# Patient Record
Sex: FEMALE | Race: WHITE | NOT HISPANIC OR LATINO | URBAN - METROPOLITAN AREA
[De-identification: names, ages, dates, MRNs, and addresses within clinical notes are randomized per-mention and may not be internally consistent; named-entity substitution may affect disease eponyms.]

---

## 2018-06-12 ENCOUNTER — EMERGENCY (EMERGENCY)
Facility: HOSPITAL | Age: 33
LOS: 1 days | Discharge: HOME | End: 2018-06-12
Attending: EMERGENCY MEDICINE
Payer: COMMERCIAL

## 2018-06-12 VITALS
RESPIRATION RATE: 20 BRPM | TEMPERATURE: 98 F | DIASTOLIC BLOOD PRESSURE: 90 MMHG | HEART RATE: 80 BPM | OXYGEN SATURATION: 100 % | SYSTOLIC BLOOD PRESSURE: 108 MMHG

## 2018-06-12 VITALS — HEART RATE: 82 BPM | SYSTOLIC BLOOD PRESSURE: 132 MMHG | RESPIRATION RATE: 20 BRPM | DIASTOLIC BLOOD PRESSURE: 84 MMHG

## 2018-06-12 LAB
ALBUMIN SERPL ELPH-MCNC: 4.4 G/DL — SIGNIFICANT CHANGE UP (ref 3.3–5)
ALP SERPL-CCNC: 40 U/L — SIGNIFICANT CHANGE UP (ref 40–120)
ALT FLD-CCNC: 11 U/L — SIGNIFICANT CHANGE UP (ref 10–45)
ANION GAP SERPL CALC-SCNC: 19 MMOL/L — HIGH (ref 5–17)
APPEARANCE UR: CLEAR — SIGNIFICANT CHANGE UP
AST SERPL-CCNC: 17 U/L — SIGNIFICANT CHANGE UP (ref 10–40)
BASOPHILS # BLD AUTO: 0.1 K/UL — SIGNIFICANT CHANGE UP (ref 0–0.2)
BASOPHILS NFR BLD AUTO: 0.4 % — SIGNIFICANT CHANGE UP (ref 0–2)
BILIRUB SERPL-MCNC: 0.6 MG/DL — SIGNIFICANT CHANGE UP (ref 0.2–1.2)
BILIRUB UR-MCNC: NEGATIVE — SIGNIFICANT CHANGE UP
BLD GP AB SCN SERPL QL: NEGATIVE — SIGNIFICANT CHANGE UP
BUN SERPL-MCNC: 11 MG/DL — SIGNIFICANT CHANGE UP (ref 7–23)
CALCIUM SERPL-MCNC: 9.6 MG/DL — SIGNIFICANT CHANGE UP (ref 8.4–10.5)
CHLORIDE SERPL-SCNC: 100 MMOL/L — SIGNIFICANT CHANGE UP (ref 96–108)
CO2 SERPL-SCNC: 18 MMOL/L — LOW (ref 22–31)
COLOR SPEC: SIGNIFICANT CHANGE UP
CREAT SERPL-MCNC: 0.83 MG/DL — SIGNIFICANT CHANGE UP (ref 0.5–1.3)
DIFF PNL FLD: ABNORMAL
EOSINOPHIL # BLD AUTO: 0.1 K/UL — SIGNIFICANT CHANGE UP (ref 0–0.5)
EOSINOPHIL NFR BLD AUTO: 0.4 % — SIGNIFICANT CHANGE UP (ref 0–6)
GLUCOSE SERPL-MCNC: 124 MG/DL — HIGH (ref 70–99)
GLUCOSE UR QL: NEGATIVE — SIGNIFICANT CHANGE UP
HCG SERPL-ACNC: 9170 MIU/ML — HIGH (ref 5–24)
HCT VFR BLD CALC: 41.5 % — SIGNIFICANT CHANGE UP (ref 34.5–45)
HGB BLD-MCNC: 14.6 G/DL — SIGNIFICANT CHANGE UP (ref 11.5–15.5)
KETONES UR-MCNC: NEGATIVE — SIGNIFICANT CHANGE UP
LEUKOCYTE ESTERASE UR-ACNC: NEGATIVE — SIGNIFICANT CHANGE UP
LYMPHOCYTES # BLD AUTO: 21.3 % — SIGNIFICANT CHANGE UP (ref 13–44)
LYMPHOCYTES # BLD AUTO: 3.3 K/UL — SIGNIFICANT CHANGE UP (ref 1–3.3)
MCHC RBC-ENTMCNC: 29.5 PG — SIGNIFICANT CHANGE UP (ref 27–34)
MCHC RBC-ENTMCNC: 35.1 GM/DL — SIGNIFICANT CHANGE UP (ref 32–36)
MCV RBC AUTO: 83.9 FL — SIGNIFICANT CHANGE UP (ref 80–100)
MONOCYTES # BLD AUTO: 0.4 K/UL — SIGNIFICANT CHANGE UP (ref 0–0.9)
MONOCYTES NFR BLD AUTO: 2.6 % — SIGNIFICANT CHANGE UP (ref 2–14)
NEUTROPHILS # BLD AUTO: 11.6 K/UL — HIGH (ref 1.8–7.4)
NEUTROPHILS NFR BLD AUTO: 75.2 % — SIGNIFICANT CHANGE UP (ref 43–77)
NITRITE UR-MCNC: NEGATIVE — SIGNIFICANT CHANGE UP
PH UR: 5.5 — SIGNIFICANT CHANGE UP (ref 5–8)
PLATELET # BLD AUTO: 288 K/UL — SIGNIFICANT CHANGE UP (ref 150–400)
POTASSIUM SERPL-MCNC: 3.8 MMOL/L — SIGNIFICANT CHANGE UP (ref 3.5–5.3)
POTASSIUM SERPL-SCNC: 3.8 MMOL/L — SIGNIFICANT CHANGE UP (ref 3.5–5.3)
PROT SERPL-MCNC: 7.9 G/DL — SIGNIFICANT CHANGE UP (ref 6–8.3)
PROT UR-MCNC: NEGATIVE — SIGNIFICANT CHANGE UP
RBC # BLD: 4.95 M/UL — SIGNIFICANT CHANGE UP (ref 3.8–5.2)
RBC # FLD: 11.7 % — SIGNIFICANT CHANGE UP (ref 10.3–14.5)
RBC CASTS # UR COMP ASSIST: SIGNIFICANT CHANGE UP /HPF (ref 0–2)
RH IG SCN BLD-IMP: POSITIVE — SIGNIFICANT CHANGE UP
SODIUM SERPL-SCNC: 137 MMOL/L — SIGNIFICANT CHANGE UP (ref 135–145)
SP GR SPEC: 1.01 — LOW (ref 1.01–1.02)
UROBILINOGEN FLD QL: NEGATIVE — SIGNIFICANT CHANGE UP
WBC # BLD: 15.5 K/UL — HIGH (ref 3.8–10.5)
WBC # FLD AUTO: 15.5 K/UL — HIGH (ref 3.8–10.5)
WBC UR QL: SIGNIFICANT CHANGE UP /HPF (ref 0–5)

## 2018-06-12 PROCEDURE — 86900 BLOOD TYPING SEROLOGIC ABO: CPT

## 2018-06-12 PROCEDURE — 99284 EMERGENCY DEPT VISIT MOD MDM: CPT

## 2018-06-12 PROCEDURE — 99284 EMERGENCY DEPT VISIT MOD MDM: CPT | Mod: 25

## 2018-06-12 PROCEDURE — 86901 BLOOD TYPING SEROLOGIC RH(D): CPT

## 2018-06-12 PROCEDURE — 76817 TRANSVAGINAL US OBSTETRIC: CPT

## 2018-06-12 PROCEDURE — 85027 COMPLETE CBC AUTOMATED: CPT

## 2018-06-12 PROCEDURE — 76817 TRANSVAGINAL US OBSTETRIC: CPT | Mod: 26

## 2018-06-12 PROCEDURE — 84702 CHORIONIC GONADOTROPIN TEST: CPT

## 2018-06-12 PROCEDURE — 81001 URINALYSIS AUTO W/SCOPE: CPT

## 2018-06-12 PROCEDURE — 93975 VASCULAR STUDY: CPT | Mod: 26

## 2018-06-12 PROCEDURE — 80053 COMPREHEN METABOLIC PANEL: CPT

## 2018-06-12 PROCEDURE — 93975 VASCULAR STUDY: CPT

## 2018-06-12 PROCEDURE — 86850 RBC ANTIBODY SCREEN: CPT

## 2018-06-12 NOTE — ED PROVIDER NOTE - PROGRESS NOTE DETAILS
TED: ZOË consulted. TED: cleared for discharge by obgyn. remains pain free. Feels and appears well. No complaints at present. Eager to leave. Stable for discharge.

## 2018-06-12 NOTE — ED PROVIDER NOTE - PLAN OF CARE
Follow up with your OBGYN within 48-72 hrs. Show copies of your reports given to you. Take all of your medications as previously prescribed.

## 2018-06-12 NOTE — ED PROVIDER NOTE - CARE PLAN
Principal Discharge DX:	Abdominal pain affecting pregnancy  Assessment and plan of treatment:	Follow up with your OBGYN within 48-72 hrs. Show copies of your reports given to you. Take all of your medications as previously prescribed.

## 2018-06-12 NOTE — ED PROVIDER NOTE - MEDICAL DECISION MAKING DETAILS
EGA 6wks with unconfirmed iup - consider torsion given story, ectopic, round ligament pain, gastroenteritis, pyelo/cystitis. HD stable. VSS. Pain free in ED. labs, hcg, TVUS, ob consult prn.

## 2018-06-12 NOTE — ED ADULT NURSE REASSESSMENT NOTE - NS ED NURSE REASSESS COMMENT FT1
US resulted. Pt awaiting OB.  at bedside Pt denies pain. Urine specimen obtained and sent to lab. US resulted. Pt awaiting OB.  at bedside Upon arrival to ED, patient c/o 10/10 LLQ abdominal pain. After 5 min, pain subsided and did not return. Urine specimen obtained and sent to lab. US resulted. Pt awaiting OB.  at bedside

## 2018-06-12 NOTE — ED ADULT NURSE NOTE - OBJECTIVE STATEMENT
32y female brought in by EMS c/o abdominal pain. A&Ox3 and VSS. Denies medical hx. , pt report 6 weeks pregnant. C/o constant RLQ abdominal pain since 10am. Denies taking pain medication at home. Pt reports n/v x2. Denies blood in vomit and stool. Denies vaginal bleeding/discharge. Denies hematuria, urinary frequency and burning upon urination. Denies chest pain, sob, fever/chills. Abdomen soft, tender in RLQ, + bowel sounds.

## 2018-06-12 NOTE — ED PROVIDER NOTE - OBJECTIVE STATEMENT
32F ega 6 wks by lmp  presenting with acute onset llq, sudden onset, severe, non radiating, described as cramping and occurring in waves. Began this am. a/w 1 episode of diarrhea and two of nausea and vomiting that occurred after onset of pain. Pain resolved while in ED. No without complaint. No fever or chills, no urinary symptoms. No vaginal discharge. pregnancy uncomplicated thus far.

## 2018-06-12 NOTE — CONSULT NOTE ADULT - ASSESSMENT
31yo  at 6wk2d by LMP (18) presenting w severe LLQ pain now resolved without any interventions.    #LLQ pain now resolved  - low concern for current ovarian torsion, cannot rule out rupture of ovarian cyst vs intermittently torsing ovary vs pain from GI etiology  - patient counseled, given precautions to return to ED if severe pain reoccurs and persists, if persistent nausea/vomiting/diarrhea  - pt verbalized understanding, all questions answered to pt apparent satisfaction    #first trimester pregnancy  - f/u outpatient w Dr. Kaiser for prenatal care    D/w Dr. Satnam Vali, PGY1

## 2018-06-12 NOTE — ED ADULT NURSE REASSESSMENT NOTE - NS ED NURSE REASSESS COMMENT FT1
OB states okay to discharge pt with outpatient OB followup. Pt discharged. VSS. OB states okay to discharge pt with outpatient OB followup. Pt discharged. VSS. Denies pain.

## 2018-06-12 NOTE — CONSULT NOTE ADULT - SUBJECTIVE AND OBJECTIVE BOX
GYNECOLOGIC CONSULT NOTE    31yo  at 6wk2d by LMP (18) presenting w severe LLQ pain from 10am to 12pm.  Pt states pain began as a cramping pain, became increasingly worse over a 20 minute period to a 10/10 sharp constant pain.  Pt states only had coffee this morning, was sitting at the time pain began, denies any physical activity, trauma.  Had diarrhea, some intermittent nausea and vomiting x2.  Pt states once she got to a room in the ED, pain resolved completely.  Currently denies nausea, vomiting, any abd pain.  No fever, chills, SOB, CP, dysuria, denies blood in stools or in urine, denies vaginal bleeding, discharge, recent illness.    OB/GYN HISTORY:  2016  Denies fibroids, vaguely believes was told she had a L ov cyst in 2016 at her 6wk postpartum visit but did not need follow-up, denies STIs, denies abnl pap smears    Surgical History:  Denies    Past Medical History: denies    No Known Allergies    Social History: denies T/E/D    REVIEW OF SYSTEMS:    CONSTITUTIONAL: No fever, weight loss, or fatigue  RESPIRATORY: No cough, wheezing, chills or hemoptysis; No shortness of breath  CARDIOVASCULAR: No chest pain, palpitations, dizziness, or leg swelling  GASTROINTESTINAL: No hematemesis; No constipation. No melena or hematochezia.  GENITOURINARY: No dysuria, frequency, hematuria, or incontinence  SKIN: No itching, burning, rashes, or lesions   MUSCULOSKELETAL: No joint pain or swelling; No muscle, back, or extremity pain    MEDICATIONS  (STANDING): PNV, vitamin D    MEDICATIONS  (PRN):    OBJECTIVE FINDINGS:    Vital Signs Last 24 Hrs  T(C): 36.4 (2018 12:00), Max: 36.4 (2018 12:00)  T(F): 97.6 (2018 12:00), Max: 97.6 (2018 12:00)  HR: 80 (2018 12:00) (80 - 82)  BP: 108/90 (2018 12:00) (108/90 - 132/84)  RR: 20 (2018 12:00) (20 - 20)  SpO2: 100% (2018 12:00) (100% - 100%)    PHYSICAL EXAM:    GENERAL: NAD, well-developed  NERVOUS SYSTEM:  Alert & Oriented X3, Good concentration  CHEST/LUNG: Clear to percussion bilaterally; No rales, rhonchi, wheezing, or rubs  HEART: Regular rate and rhythm; No murmurs, rubs, or gallops  ABDOMEN: Soft, Nontender, Nondistended; Bowel sounds present, No rebound, No guarding  EXTREMITIES:  2+ Peripheral Pulses, No clubbing, cyanosis, or edema, Deepak's sign negative  SKIN: No rashes or lesions  PELVIC: no CMT, no vaginal bleeding or discharge noted, no adnexal masses palpated bilaterally    LABS:                        14.6   15.5  )-----------( 288      ( 2018 12:53 )             41.5         137  |  100  |  11  ----------------------------<  124<H>  3.8   |  18<L>  |  0.83    Ca    9.6      2018 12:53    TPro  7.9  /  Alb  4.4  /  TBili  0.6  /  DBili  x   /  AST  17  /  ALT  11  /  AlkPhos  40        Urinalysis Basic - ( 2018 14:48 )    Color: PL Yellow / Appearance: Clear / S.007 / pH: x  Gluc: x / Ketone: Negative  / Bili: Negative / Urobili: Negative   Blood: x / Protein: Negative / Nitrite: Negative   Leuk Esterase: Negative / RBC: 0-2 /HPF / WBC 3-5 /HPF   Sq Epi: x / Non Sq Epi: x / Bacteria: x      RADIOLOGY & ADDITIONAL STUDIES:  < from: US Doppler Pelvis (18 @ 13:54) >  TECHNIQUE: Transabdominal and Endovaginal pelvic sonogram. Color and   spectral Doppler was performed of the ovaries.    FINDINGS:    Uterus: 8.5 x 4.8 x 6.6 cm.    Gestational Sac Size (mean): 0.9 cm, corresponding to a less than 5 week   gestation. No fetal pole or fetal cardiac activity was visualized. A 0.4   cm subchorionic hematoma is noted.    Yolk Sac: Normal.    Right ovary: 1.9 x 1.2 x 1.3 cm. Within normal limits. Color and spectral   Doppler demonstrated arterial and venous flow.    Left ovary: 3.2 x 2.5 x 3.1 cm. Contains a 2.5 x 1.9 x 1.8 cm corpus   luteal cyst. Color and spectral Doppler demonstrated arterial and venous   flow.    Inferior to the left ovary is a cyst measuring 4.1 x 2.9 x 4.2 cm and an   adjacent smaller cyst measuring 0.7 x 1.1 x 0.7 cm.    Fluid: Small amount of free fluid adjacent to the left adnexal cyst.    IMPRESSION: An intrauterine gestational sac, with mean sac diameter   corresponding to a less than 5 week gestation. A yolk sac is seen,   however, no fetal pole or fetal cardiac activity is seen. Close interval   follow-up is suggested to assess for fetal viability.    A 4.2 cm left adnexal cyst is seen inferior to the left ovary, with an   adjacent 1.1 cm cyst. The differential diagnosis includes a paraovarian   cyst or a peritubal cyst. Given the history of acute onset of pain,   torsion cannot be excluded.    < end of copied text >

## 2019-02-04 ENCOUNTER — INPATIENT (INPATIENT)
Facility: HOSPITAL | Age: 34
LOS: 1 days | Discharge: HOME | End: 2019-02-06
Attending: OBSTETRICS & GYNECOLOGY | Admitting: OBSTETRICS & GYNECOLOGY
Payer: COMMERCIAL

## 2019-02-04 VITALS — WEIGHT: 163.14 LBS

## 2019-02-04 DIAGNOSIS — Z3A.00 WEEKS OF GESTATION OF PREGNANCY NOT SPECIFIED: ICD-10-CM

## 2019-02-04 DIAGNOSIS — O26.899 OTHER SPECIFIED PREGNANCY RELATED CONDITIONS, UNSPECIFIED TRIMESTER: ICD-10-CM

## 2019-02-04 DIAGNOSIS — Z34.80 ENCOUNTER FOR SUPERVISION OF OTHER NORMAL PREGNANCY, UNSPECIFIED TRIMESTER: ICD-10-CM

## 2019-02-04 LAB
BASOPHILS # BLD AUTO: 0 K/UL — SIGNIFICANT CHANGE UP (ref 0–0.2)
BASOPHILS NFR BLD AUTO: 0.4 % — SIGNIFICANT CHANGE UP (ref 0–2)
BLD GP AB SCN SERPL QL: NEGATIVE — SIGNIFICANT CHANGE UP
EOSINOPHIL # BLD AUTO: 0.1 K/UL — SIGNIFICANT CHANGE UP (ref 0–0.5)
EOSINOPHIL NFR BLD AUTO: 0.5 % — SIGNIFICANT CHANGE UP (ref 0–6)
HCT VFR BLD CALC: 38.5 % — SIGNIFICANT CHANGE UP (ref 34.5–45)
HGB BLD-MCNC: 13.1 G/DL — SIGNIFICANT CHANGE UP (ref 11.5–15.5)
LYMPHOCYTES # BLD AUTO: 2.6 K/UL — SIGNIFICANT CHANGE UP (ref 1–3.3)
LYMPHOCYTES # BLD AUTO: 23 % — SIGNIFICANT CHANGE UP (ref 13–44)
MCHC RBC-ENTMCNC: 27.3 PG — SIGNIFICANT CHANGE UP (ref 27–34)
MCHC RBC-ENTMCNC: 34 GM/DL — SIGNIFICANT CHANGE UP (ref 32–36)
MCV RBC AUTO: 80.3 FL — SIGNIFICANT CHANGE UP (ref 80–100)
MONOCYTES # BLD AUTO: 0.7 K/UL — SIGNIFICANT CHANGE UP (ref 0–0.9)
MONOCYTES NFR BLD AUTO: 6.2 % — SIGNIFICANT CHANGE UP (ref 2–14)
NEUTROPHILS # BLD AUTO: 7.8 K/UL — HIGH (ref 1.8–7.4)
NEUTROPHILS NFR BLD AUTO: 69.9 % — SIGNIFICANT CHANGE UP (ref 43–77)
PLATELET # BLD AUTO: 199 K/UL — SIGNIFICANT CHANGE UP (ref 150–400)
RBC # BLD: 4.8 M/UL — SIGNIFICANT CHANGE UP (ref 3.8–5.2)
RBC # FLD: 15.9 % — HIGH (ref 10.3–14.5)
RH IG SCN BLD-IMP: POSITIVE — SIGNIFICANT CHANGE UP
WBC # BLD: 11.1 K/UL — HIGH (ref 3.8–10.5)
WBC # FLD AUTO: 11.1 K/UL — HIGH (ref 3.8–10.5)

## 2019-02-04 RX ORDER — ACETAMINOPHEN 500 MG
975 TABLET ORAL EVERY 6 HOURS
Qty: 0 | Refills: 0 | Status: COMPLETED | OUTPATIENT
Start: 2019-02-04 | End: 2020-01-03

## 2019-02-04 RX ORDER — AER TRAVELER 0.5 G/1
1 SOLUTION RECTAL; TOPICAL EVERY 4 HOURS
Qty: 0 | Refills: 0 | Status: DISCONTINUED | OUTPATIENT
Start: 2019-02-04 | End: 2019-02-04

## 2019-02-04 RX ORDER — GLYCERIN ADULT
1 SUPPOSITORY, RECTAL RECTAL AT BEDTIME
Qty: 0 | Refills: 0 | Status: DISCONTINUED | OUTPATIENT
Start: 2019-02-05 | End: 2019-02-06

## 2019-02-04 RX ORDER — PRAMOXINE HYDROCHLORIDE 150 MG/15G
1 AEROSOL, FOAM RECTAL EVERY 4 HOURS
Qty: 0 | Refills: 0 | Status: DISCONTINUED | OUTPATIENT
Start: 2019-02-05 | End: 2019-02-06

## 2019-02-04 RX ORDER — OXYTOCIN 10 UNIT/ML
333.33 VIAL (ML) INJECTION
Qty: 20 | Refills: 0 | Status: DISCONTINUED | OUTPATIENT
Start: 2019-02-04 | End: 2019-02-04

## 2019-02-04 RX ORDER — AER TRAVELER 0.5 G/1
1 SOLUTION RECTAL; TOPICAL EVERY 4 HOURS
Qty: 0 | Refills: 0 | Status: DISCONTINUED | OUTPATIENT
Start: 2019-02-05 | End: 2019-02-06

## 2019-02-04 RX ORDER — CITRIC ACID/SODIUM CITRATE 300-500 MG
15 SOLUTION, ORAL ORAL EVERY 4 HOURS
Qty: 0 | Refills: 0 | Status: DISCONTINUED | OUTPATIENT
Start: 2019-02-04 | End: 2019-02-04

## 2019-02-04 RX ORDER — OXYTOCIN 10 UNIT/ML
4 VIAL (ML) INJECTION
Qty: 30 | Refills: 0 | Status: DISCONTINUED | OUTPATIENT
Start: 2019-02-04 | End: 2019-02-06

## 2019-02-04 RX ORDER — SODIUM CHLORIDE 9 MG/ML
1000 INJECTION, SOLUTION INTRAVENOUS
Qty: 0 | Refills: 0 | Status: DISCONTINUED | OUTPATIENT
Start: 2019-02-04 | End: 2019-02-04

## 2019-02-04 RX ORDER — LANOLIN
1 OINTMENT (GRAM) TOPICAL EVERY 6 HOURS
Qty: 0 | Refills: 0 | Status: DISCONTINUED | OUTPATIENT
Start: 2019-02-05 | End: 2019-02-06

## 2019-02-04 RX ORDER — MAGNESIUM HYDROXIDE 400 MG/1
30 TABLET, CHEWABLE ORAL
Qty: 0 | Refills: 0 | Status: DISCONTINUED | OUTPATIENT
Start: 2019-02-05 | End: 2019-02-06

## 2019-02-04 RX ORDER — HYDROCORTISONE 1 %
1 OINTMENT (GRAM) TOPICAL EVERY 4 HOURS
Qty: 0 | Refills: 0 | Status: DISCONTINUED | OUTPATIENT
Start: 2019-02-05 | End: 2019-02-06

## 2019-02-04 RX ORDER — OXYTOCIN 10 UNIT/ML
41.67 VIAL (ML) INJECTION
Qty: 20 | Refills: 0 | Status: DISCONTINUED | OUTPATIENT
Start: 2019-02-04 | End: 2019-02-06

## 2019-02-04 RX ORDER — KETOROLAC TROMETHAMINE 30 MG/ML
30 SYRINGE (ML) INJECTION ONCE
Qty: 0 | Refills: 0 | Status: DISCONTINUED | OUTPATIENT
Start: 2019-02-04 | End: 2019-02-04

## 2019-02-04 RX ORDER — SIMETHICONE 80 MG/1
80 TABLET, CHEWABLE ORAL EVERY 6 HOURS
Qty: 0 | Refills: 0 | Status: DISCONTINUED | OUTPATIENT
Start: 2019-02-05 | End: 2019-02-06

## 2019-02-04 RX ORDER — DIPHENHYDRAMINE HCL 50 MG
25 CAPSULE ORAL EVERY 6 HOURS
Qty: 0 | Refills: 0 | Status: DISCONTINUED | OUTPATIENT
Start: 2019-02-05 | End: 2019-02-06

## 2019-02-04 RX ORDER — DIBUCAINE 1 %
1 OINTMENT (GRAM) RECTAL EVERY 4 HOURS
Qty: 0 | Refills: 0 | Status: DISCONTINUED | OUTPATIENT
Start: 2019-02-04 | End: 2019-02-04

## 2019-02-04 RX ORDER — SODIUM CHLORIDE 9 MG/ML
500 INJECTION, SOLUTION INTRAVENOUS ONCE
Qty: 0 | Refills: 0 | Status: DISCONTINUED | OUTPATIENT
Start: 2019-02-04 | End: 2019-02-04

## 2019-02-04 RX ORDER — DIBUCAINE 1 %
1 OINTMENT (GRAM) RECTAL EVERY 4 HOURS
Qty: 0 | Refills: 0 | Status: DISCONTINUED | OUTPATIENT
Start: 2019-02-05 | End: 2019-02-06

## 2019-02-04 RX ORDER — SODIUM CHLORIDE 9 MG/ML
3 INJECTION INTRAMUSCULAR; INTRAVENOUS; SUBCUTANEOUS EVERY 8 HOURS
Qty: 0 | Refills: 0 | Status: DISCONTINUED | OUTPATIENT
Start: 2019-02-04 | End: 2019-02-04

## 2019-02-04 RX ORDER — OXYCODONE HYDROCHLORIDE 5 MG/1
5 TABLET ORAL EVERY 4 HOURS
Qty: 0 | Refills: 0 | Status: DISCONTINUED | OUTPATIENT
Start: 2019-02-04 | End: 2019-02-06

## 2019-02-04 RX ORDER — HYDROCORTISONE 1 %
1 OINTMENT (GRAM) TOPICAL EVERY 4 HOURS
Qty: 0 | Refills: 0 | Status: DISCONTINUED | OUTPATIENT
Start: 2019-02-04 | End: 2019-02-04

## 2019-02-04 RX ORDER — IBUPROFEN 200 MG
600 TABLET ORAL EVERY 6 HOURS
Qty: 0 | Refills: 0 | Status: COMPLETED | OUTPATIENT
Start: 2019-02-04 | End: 2020-01-03

## 2019-02-04 RX ORDER — DOCUSATE SODIUM 100 MG
100 CAPSULE ORAL
Qty: 0 | Refills: 0 | Status: DISCONTINUED | OUTPATIENT
Start: 2019-02-05 | End: 2019-02-06

## 2019-02-04 RX ORDER — OXYCODONE HYDROCHLORIDE 5 MG/1
5 TABLET ORAL
Qty: 0 | Refills: 0 | Status: DISCONTINUED | OUTPATIENT
Start: 2019-02-04 | End: 2019-02-06

## 2019-02-04 RX ORDER — PRAMOXINE HYDROCHLORIDE 150 MG/15G
1 AEROSOL, FOAM RECTAL EVERY 4 HOURS
Qty: 0 | Refills: 0 | Status: DISCONTINUED | OUTPATIENT
Start: 2019-02-04 | End: 2019-02-04

## 2019-02-04 RX ADMIN — Medication 4 MILLIUNIT(S)/MIN: at 20:32

## 2019-02-04 RX ADMIN — Medication 30 MILLIGRAM(S): at 22:10

## 2019-02-05 VITALS
OXYGEN SATURATION: 99 % | DIASTOLIC BLOOD PRESSURE: 73 MMHG | RESPIRATION RATE: 17 BRPM | SYSTOLIC BLOOD PRESSURE: 102 MMHG | TEMPERATURE: 98 F | HEART RATE: 78 BPM

## 2019-02-05 LAB
HCT VFR BLD CALC: 35.2 % — SIGNIFICANT CHANGE UP (ref 34.5–45)
HGB BLD-MCNC: 11.3 G/DL — LOW (ref 11.5–15.5)
T PALLIDUM AB TITR SER: NEGATIVE — SIGNIFICANT CHANGE UP

## 2019-02-05 RX ORDER — IBUPROFEN 200 MG
600 TABLET ORAL EVERY 6 HOURS
Qty: 0 | Refills: 0 | Status: DISCONTINUED | OUTPATIENT
Start: 2019-02-05 | End: 2019-02-06

## 2019-02-05 RX ORDER — ACETAMINOPHEN 500 MG
975 TABLET ORAL EVERY 6 HOURS
Qty: 0 | Refills: 0 | Status: DISCONTINUED | OUTPATIENT
Start: 2019-02-05 | End: 2019-02-06

## 2019-02-05 RX ADMIN — Medication 600 MILLIGRAM(S): at 06:50

## 2019-02-05 RX ADMIN — Medication 600 MILLIGRAM(S): at 18:13

## 2019-02-05 RX ADMIN — Medication 600 MILLIGRAM(S): at 12:20

## 2019-02-05 RX ADMIN — Medication 600 MILLIGRAM(S): at 05:54

## 2019-02-05 RX ADMIN — Medication 975 MILLIGRAM(S): at 05:56

## 2019-02-05 RX ADMIN — Medication 975 MILLIGRAM(S): at 16:25

## 2019-02-05 RX ADMIN — Medication 1 TABLET(S): at 11:50

## 2019-02-05 RX ADMIN — Medication 600 MILLIGRAM(S): at 11:50

## 2019-02-05 RX ADMIN — Medication 975 MILLIGRAM(S): at 06:50

## 2019-02-05 RX ADMIN — Medication 975 MILLIGRAM(S): at 15:55

## 2019-02-05 NOTE — PROGRESS NOTE ADULT - SUBJECTIVE AND OBJECTIVE BOX
OB Progress Note:  PPD#1    S: 32yo  PPD#1 s/p . Patient feels well. Pain is well controlled. She is tolerating a regular diet and passing flatus. She is voiding spontaneously, and ambulating without difficulty. Denies CP/SOB. Denies lightheadedness/dizziness. Denies N/V.    O:  Vitals:  Vital Signs Last 24 Hrs  T(C): 36.9 (2019 23:30), Max: 36.9 (2019 23:30)  T(F): 98.4 (2019 23:30), Max: 98.4 (2019 23:30)  HR: 96 (2019 23:30) (92 - 96)  BP: 130/75 (2019 23:30) (106/75 - 130/75)  BP(mean): 94 (2019 23:30) (83 - 95)  RR: 18 (2019 23:30) (15 - 18)  SpO2: 97% (2019 23:30) (96% - 99%)    MEDICATIONS  (STANDING):  acetaminophen   Tablet .. 975 milliGRAM(s) Oral every 6 hours  ibuprofen  Tablet. 600 milliGRAM(s) Oral every 6 hours  oxyCODONE    IR 5 milliGRAM(s) Oral every 3 hours  oxytocin Infusion 41.667 milliUNIT(s)/Min (125 mL/Hr) IV Continuous <Continuous>  oxytocin Infusion 4 milliUNIT(s)/Min (4 mL/Hr) IV Continuous <Continuous>  prenatal multivitamin 1 Tablet(s) Oral daily      Labs:  Blood type: O Positive  Rubella IgG: RPR:                           13.1   11.1<H> >-----------< 199    (  @ 19:16 )             38.5                  Physical Exam:  General: NAD  Abdomen: soft, non-tender, non-distended, fundus firm  Vaginal: Lochia wnl  Extremities: No erythema/edema

## 2019-02-05 NOTE — PROGRESS NOTE ADULT - PROBLEM SELECTOR PLAN 1
- Pain well controlled, continue current pain regimen  - Increase ambulation, SCDs when not ambulating  - Continue regular diet    Sindhu English PGY1

## 2019-02-06 ENCOUNTER — TRANSCRIPTION ENCOUNTER (OUTPATIENT)
Age: 34
End: 2019-02-06

## 2019-02-06 PROCEDURE — 59050 FETAL MONITOR W/REPORT: CPT

## 2019-02-06 PROCEDURE — 85027 COMPLETE CBC AUTOMATED: CPT

## 2019-02-06 PROCEDURE — G0463: CPT

## 2019-02-06 PROCEDURE — 85018 HEMOGLOBIN: CPT

## 2019-02-06 PROCEDURE — 86850 RBC ANTIBODY SCREEN: CPT

## 2019-02-06 PROCEDURE — 85014 HEMATOCRIT: CPT

## 2019-02-06 PROCEDURE — 86901 BLOOD TYPING SEROLOGIC RH(D): CPT

## 2019-02-06 PROCEDURE — 59025 FETAL NON-STRESS TEST: CPT

## 2019-02-06 PROCEDURE — 86780 TREPONEMA PALLIDUM: CPT

## 2019-02-06 PROCEDURE — 86900 BLOOD TYPING SEROLOGIC ABO: CPT

## 2019-02-06 RX ADMIN — Medication 600 MILLIGRAM(S): at 08:40

## 2019-02-06 RX ADMIN — Medication 600 MILLIGRAM(S): at 08:09

## 2019-02-06 RX ADMIN — Medication 600 MILLIGRAM(S): at 01:15

## 2019-02-06 RX ADMIN — Medication 600 MILLIGRAM(S): at 00:36

## 2019-02-06 NOTE — DISCHARGE NOTE OB - CARE PROVIDER_API CALL
Pastor Hay)  Obstetrics and Gynecology  [address] [place] [id number]  Phone: [phone]  Fax: [phone]  Follow Up Time:

## 2019-02-06 NOTE — DISCHARGE NOTE OB - PATIENT PORTAL LINK FT
You can access the [facility], offered by [facility], by registering with the following website: [email or web address]

## 2019-06-07 ENCOUNTER — TRANSCRIPTION ENCOUNTER (OUTPATIENT)
Age: 34
End: 2019-06-07

## 2022-03-15 ENCOUNTER — TRANSCRIPTION ENCOUNTER (OUTPATIENT)
Age: 37
End: 2022-03-15

## 2022-03-16 ENCOUNTER — INPATIENT (INPATIENT)
Facility: HOSPITAL | Age: 37
LOS: 0 days | Discharge: HOME | DRG: 743 | End: 2022-03-17
Attending: OBSTETRICS & GYNECOLOGY | Admitting: OBSTETRICS & GYNECOLOGY
Payer: COMMERCIAL

## 2022-03-16 ENCOUNTER — TRANSCRIPTION ENCOUNTER (OUTPATIENT)
Age: 37
End: 2022-03-16

## 2022-03-16 VITALS
OXYGEN SATURATION: 100 % | HEIGHT: 65 IN | RESPIRATION RATE: 18 BRPM | HEART RATE: 87 BPM | DIASTOLIC BLOOD PRESSURE: 92 MMHG | SYSTOLIC BLOOD PRESSURE: 148 MMHG | TEMPERATURE: 98 F | WEIGHT: 139.99 LBS

## 2022-03-16 DIAGNOSIS — R10.32 LEFT LOWER QUADRANT PAIN: ICD-10-CM

## 2022-03-16 LAB
ALBUMIN SERPL ELPH-MCNC: 4.7 G/DL — SIGNIFICANT CHANGE UP (ref 3.3–5)
ALP SERPL-CCNC: 42 U/L — SIGNIFICANT CHANGE UP (ref 40–120)
ALT FLD-CCNC: 11 U/L — SIGNIFICANT CHANGE UP (ref 10–45)
ANION GAP SERPL CALC-SCNC: 14 MMOL/L — SIGNIFICANT CHANGE UP (ref 5–17)
APPEARANCE UR: ABNORMAL
APTT BLD: 30.3 SEC — SIGNIFICANT CHANGE UP (ref 27.5–35.5)
AST SERPL-CCNC: 15 U/L — SIGNIFICANT CHANGE UP (ref 10–40)
BACTERIA # UR AUTO: NEGATIVE — SIGNIFICANT CHANGE UP
BILIRUB SERPL-MCNC: 0.6 MG/DL — SIGNIFICANT CHANGE UP (ref 0.2–1.2)
BILIRUB UR-MCNC: NEGATIVE — SIGNIFICANT CHANGE UP
BLD GP AB SCN SERPL QL: NEGATIVE — SIGNIFICANT CHANGE UP
BUN SERPL-MCNC: 15 MG/DL — SIGNIFICANT CHANGE UP (ref 7–23)
CALCIUM SERPL-MCNC: 9.6 MG/DL — SIGNIFICANT CHANGE UP (ref 8.4–10.5)
CHLORIDE SERPL-SCNC: 104 MMOL/L — SIGNIFICANT CHANGE UP (ref 96–108)
CO2 SERPL-SCNC: 21 MMOL/L — LOW (ref 22–31)
COLOR SPEC: YELLOW — SIGNIFICANT CHANGE UP
COMMENT - URINE: SIGNIFICANT CHANGE UP
CREAT SERPL-MCNC: 0.74 MG/DL — SIGNIFICANT CHANGE UP (ref 0.5–1.3)
DIFF PNL FLD: NEGATIVE — SIGNIFICANT CHANGE UP
EGFR: 107 ML/MIN/1.73M2 — SIGNIFICANT CHANGE UP
EPI CELLS # UR: 6 /HPF — HIGH
GLUCOSE SERPL-MCNC: 116 MG/DL — HIGH (ref 70–99)
GLUCOSE UR QL: NEGATIVE — SIGNIFICANT CHANGE UP
HCG SERPL-ACNC: <2 MIU/ML — SIGNIFICANT CHANGE UP
HCT VFR BLD CALC: 41.3 % — SIGNIFICANT CHANGE UP (ref 34.5–45)
HGB BLD-MCNC: 13.5 G/DL — SIGNIFICANT CHANGE UP (ref 11.5–15.5)
HYALINE CASTS # UR AUTO: 13 /LPF — HIGH (ref 0–2)
INR BLD: 1.03 RATIO — SIGNIFICANT CHANGE UP (ref 0.88–1.16)
KETONES UR-MCNC: NEGATIVE — SIGNIFICANT CHANGE UP
LEUKOCYTE ESTERASE UR-ACNC: ABNORMAL
MCHC RBC-ENTMCNC: 27.6 PG — SIGNIFICANT CHANGE UP (ref 27–34)
MCHC RBC-ENTMCNC: 32.7 GM/DL — SIGNIFICANT CHANGE UP (ref 32–36)
MCV RBC AUTO: 84.3 FL — SIGNIFICANT CHANGE UP (ref 80–100)
NITRITE UR-MCNC: NEGATIVE — SIGNIFICANT CHANGE UP
NRBC # BLD: 0 /100 WBCS — SIGNIFICANT CHANGE UP (ref 0–0)
PH UR: 5.5 — SIGNIFICANT CHANGE UP (ref 5–8)
PLATELET # BLD AUTO: 260 K/UL — SIGNIFICANT CHANGE UP (ref 150–400)
POTASSIUM SERPL-MCNC: 4 MMOL/L — SIGNIFICANT CHANGE UP (ref 3.5–5.3)
POTASSIUM SERPL-SCNC: 4 MMOL/L — SIGNIFICANT CHANGE UP (ref 3.5–5.3)
PROT SERPL-MCNC: 7.6 G/DL — SIGNIFICANT CHANGE UP (ref 6–8.3)
PROT UR-MCNC: ABNORMAL
PROTHROM AB SERPL-ACNC: 11.9 SEC — SIGNIFICANT CHANGE UP (ref 10.5–13.4)
RBC # BLD: 4.9 M/UL — SIGNIFICANT CHANGE UP (ref 3.8–5.2)
RBC # FLD: 12.9 % — SIGNIFICANT CHANGE UP (ref 10.3–14.5)
RBC CASTS # UR COMP ASSIST: 1 /HPF — SIGNIFICANT CHANGE UP (ref 0–4)
RH IG SCN BLD-IMP: POSITIVE — SIGNIFICANT CHANGE UP
SARS-COV-2 RNA SPEC QL NAA+PROBE: SIGNIFICANT CHANGE UP
SODIUM SERPL-SCNC: 139 MMOL/L — SIGNIFICANT CHANGE UP (ref 135–145)
SP GR SPEC: 1.04 — HIGH (ref 1.01–1.02)
UROBILINOGEN FLD QL: NEGATIVE — SIGNIFICANT CHANGE UP
WBC # BLD: 9.96 K/UL — SIGNIFICANT CHANGE UP (ref 3.8–10.5)
WBC # FLD AUTO: 9.96 K/UL — SIGNIFICANT CHANGE UP (ref 3.8–10.5)
WBC UR QL: 6 /HPF — HIGH (ref 0–5)

## 2022-03-16 PROCEDURE — G1004: CPT

## 2022-03-16 PROCEDURE — 99285 EMERGENCY DEPT VISIT HI MDM: CPT

## 2022-03-16 PROCEDURE — 93975 VASCULAR STUDY: CPT | Mod: 26

## 2022-03-16 PROCEDURE — 74177 CT ABD & PELVIS W/CONTRAST: CPT | Mod: 26,MG

## 2022-03-16 PROCEDURE — 76830 TRANSVAGINAL US NON-OB: CPT | Mod: 26

## 2022-03-16 RX ORDER — LIDOCAINE 4 G/100G
1 CREAM TOPICAL ONCE
Refills: 0 | Status: COMPLETED | OUTPATIENT
Start: 2022-03-16 | End: 2022-03-16

## 2022-03-16 RX ORDER — SODIUM CHLORIDE 9 MG/ML
1000 INJECTION, SOLUTION INTRAVENOUS
Refills: 0 | Status: DISCONTINUED | OUTPATIENT
Start: 2022-03-16 | End: 2022-03-17

## 2022-03-16 RX ORDER — ACETAMINOPHEN 500 MG
1000 TABLET ORAL ONCE
Refills: 0 | Status: COMPLETED | OUTPATIENT
Start: 2022-03-16 | End: 2022-03-16

## 2022-03-16 RX ORDER — ACETAMINOPHEN 500 MG
1000 TABLET ORAL EVERY 8 HOURS
Refills: 0 | Status: COMPLETED | OUTPATIENT
Start: 2022-03-16 | End: 2022-03-16

## 2022-03-16 RX ORDER — ACETAMINOPHEN 500 MG
975 TABLET ORAL EVERY 6 HOURS
Refills: 0 | Status: DISCONTINUED | OUTPATIENT
Start: 2022-03-16 | End: 2022-03-17

## 2022-03-16 RX ORDER — MORPHINE SULFATE 50 MG/1
4 CAPSULE, EXTENDED RELEASE ORAL ONCE
Refills: 0 | Status: DISCONTINUED | OUTPATIENT
Start: 2022-03-16 | End: 2022-03-16

## 2022-03-16 RX ORDER — ONDANSETRON 8 MG/1
4 TABLET, FILM COATED ORAL ONCE
Refills: 0 | Status: COMPLETED | OUTPATIENT
Start: 2022-03-16 | End: 2022-03-16

## 2022-03-16 RX ORDER — KETOROLAC TROMETHAMINE 30 MG/ML
15 SYRINGE (ML) INJECTION EVERY 6 HOURS
Refills: 0 | Status: DISCONTINUED | OUTPATIENT
Start: 2022-03-16 | End: 2022-03-16

## 2022-03-16 RX ORDER — SIMETHICONE 80 MG/1
80 TABLET, CHEWABLE ORAL DAILY
Refills: 0 | Status: DISCONTINUED | OUTPATIENT
Start: 2022-03-16 | End: 2022-03-17

## 2022-03-16 RX ORDER — HYDROMORPHONE HYDROCHLORIDE 2 MG/ML
1 INJECTION INTRAMUSCULAR; INTRAVENOUS; SUBCUTANEOUS ONCE
Refills: 0 | Status: DISCONTINUED | OUTPATIENT
Start: 2022-03-16 | End: 2022-03-16

## 2022-03-16 RX ORDER — KETOROLAC TROMETHAMINE 30 MG/ML
15 SYRINGE (ML) INJECTION ONCE
Refills: 0 | Status: DISCONTINUED | OUTPATIENT
Start: 2022-03-16 | End: 2022-03-16

## 2022-03-16 RX ORDER — IBUPROFEN 200 MG
600 TABLET ORAL EVERY 6 HOURS
Refills: 0 | Status: DISCONTINUED | OUTPATIENT
Start: 2022-03-16 | End: 2022-03-17

## 2022-03-16 RX ORDER — DIPHENHYDRAMINE HCL 50 MG
50 CAPSULE ORAL ONCE
Refills: 0 | Status: COMPLETED | OUTPATIENT
Start: 2022-03-16 | End: 2022-03-16

## 2022-03-16 RX ADMIN — ONDANSETRON 4 MILLIGRAM(S): 8 TABLET, FILM COATED ORAL at 05:05

## 2022-03-16 RX ADMIN — Medication 600 MILLIGRAM(S): at 21:32

## 2022-03-16 RX ADMIN — Medication 15 MILLIGRAM(S): at 08:44

## 2022-03-16 RX ADMIN — Medication 15 MILLIGRAM(S): at 06:40

## 2022-03-16 RX ADMIN — ONDANSETRON 4 MILLIGRAM(S): 8 TABLET, FILM COATED ORAL at 13:47

## 2022-03-16 RX ADMIN — ONDANSETRON 4 MILLIGRAM(S): 8 TABLET, FILM COATED ORAL at 08:02

## 2022-03-16 RX ADMIN — Medication 1000 MILLIGRAM(S): at 03:45

## 2022-03-16 RX ADMIN — Medication 15 MILLIGRAM(S): at 08:02

## 2022-03-16 RX ADMIN — Medication 50 MILLIGRAM(S): at 22:51

## 2022-03-16 RX ADMIN — HYDROMORPHONE HYDROCHLORIDE 1 MILLIGRAM(S): 2 INJECTION INTRAMUSCULAR; INTRAVENOUS; SUBCUTANEOUS at 13:46

## 2022-03-16 RX ADMIN — Medication 400 MILLIGRAM(S): at 03:30

## 2022-03-16 RX ADMIN — MORPHINE SULFATE 4 MILLIGRAM(S): 50 CAPSULE, EXTENDED RELEASE ORAL at 05:48

## 2022-03-16 RX ADMIN — Medication 1000 MILLIGRAM(S): at 04:39

## 2022-03-16 RX ADMIN — MORPHINE SULFATE 4 MILLIGRAM(S): 50 CAPSULE, EXTENDED RELEASE ORAL at 05:05

## 2022-03-16 RX ADMIN — LIDOCAINE 1 PATCH: 4 CREAM TOPICAL at 08:44

## 2022-03-16 RX ADMIN — Medication 1000 MILLIGRAM(S): at 15:44

## 2022-03-16 RX ADMIN — SIMETHICONE 80 MILLIGRAM(S): 80 TABLET, CHEWABLE ORAL at 22:49

## 2022-03-16 RX ADMIN — LIDOCAINE 1 PATCH: 4 CREAM TOPICAL at 18:25

## 2022-03-16 RX ADMIN — Medication 400 MILLIGRAM(S): at 14:00

## 2022-03-16 RX ADMIN — HYDROMORPHONE HYDROCHLORIDE 1 MILLIGRAM(S): 2 INJECTION INTRAMUSCULAR; INTRAVENOUS; SUBCUTANEOUS at 08:03

## 2022-03-16 RX ADMIN — HYDROMORPHONE HYDROCHLORIDE 1 MILLIGRAM(S): 2 INJECTION INTRAMUSCULAR; INTRAVENOUS; SUBCUTANEOUS at 08:44

## 2022-03-16 RX ADMIN — HYDROMORPHONE HYDROCHLORIDE 1 MILLIGRAM(S): 2 INJECTION INTRAMUSCULAR; INTRAVENOUS; SUBCUTANEOUS at 15:43

## 2022-03-16 RX ADMIN — SODIUM CHLORIDE 100 MILLILITER(S): 9 INJECTION, SOLUTION INTRAVENOUS at 13:48

## 2022-03-16 RX ADMIN — Medication 975 MILLIGRAM(S): at 22:49

## 2022-03-16 RX ADMIN — LIDOCAINE 1 PATCH: 4 CREAM TOPICAL at 06:44

## 2022-03-16 NOTE — PATIENT PROFILE ADULT - FALL HARM RISK - UNIVERSAL INTERVENTIONS
Bed in lowest position, wheels locked, appropriate side rails in place/Call bell, personal items and telephone in reach/Instruct patient to call for assistance before getting out of bed or chair/Non-slip footwear when patient is out of bed/[place] to call system/Physically safe environment - no spills, clutter or unnecessary equipment/Purposeful Proactive Rounding/Room/bathroom lighting operational, light cord in reach

## 2022-03-16 NOTE — CONSULT NOTE ADULT - ASSESSMENT
- Patient to be re-evaluated, recently s/p medication and in no pain currently, benign abdominal exam    [] FULL RECS PENDING ATTENDING FRANCISCO JAVIER Nunez PGY2

## 2022-03-16 NOTE — ED PROVIDER NOTE - PROGRESS NOTE DETAILS
pelvic exam chaperoned by PAULY Mart.  Physiologic cervical discharge with endocervical swab sent for GC amplification.  speculum exam no vaginal bleeding.  Bimanual exam without right/left adnexal tenderness no CMT. Patient reevaluated, in severe pain. states ? similar to episode 4 years ago and they were going to r/o torsion and resolved at the time. will redose toradol and dilaudid. will consult obgyn Scar: reassessed, patient states pain improved with dilauidd but still feels pain in llq. reexamined, + ttp llq.  will consult ob and advise to reevalute at this time. Scar: severe pain returned. reconsulted obgyn to evaluate patient. Sowmya: Patient evaluated by obgyn. pending recommendations. Scar; Patient in severe pain still. will redose dilaudid.

## 2022-03-16 NOTE — ED PROVIDER NOTE - OBJECTIVE STATEMENT
36F with pmh L ovarian torsion 4 years ago, who presents with LLQ non-radiating.  Pain began this evening, gradual in onset reached max intensity over 2 hours, is now 7/10.  Took aleve without significant improvement.  No n/v/d/c.  Better with direct abdominal pressure on the area.

## 2022-03-16 NOTE — H&P ADULT - ATTENDING COMMENTS
Agree with above   Patient admitted for observation and pain management  Has required multiple pain medications but does not have a surgical abdomen or signs of torsion  Pelvic sono demonstrating flow to ovary.    See addended note from 8:50pm for bedside exam    lizandro garcia

## 2022-03-16 NOTE — H&P ADULT - NSHPLABSRESULTS_GEN_ALL_CORE
LABS:             13.5   9.96  )-----------( 260      ( 16 Mar 2022 03:13 )             41.3     03-16  139  |  104  |  15  ----------------------------<  116<H>  4.0   |  21<L>  |  0.74    Ca    9.6      16 Mar 2022 03:13    TPro  7.6  /  Alb  4.7  /  TBili  0.6  /  DBili  x   /  AST  15  /  ALT  11  /  AlkPhos  42  03-16    PT/INR - ( 16 Mar 2022 03:13 )   PT: 11.9 sec;   INR: 1.03 ratio       PTT - ( 16 Mar 2022 03:13 )  PTT:30.3 sec    Urinalysis Basic - ( 16 Mar 2022 06:43 )  Color: Yellow / Appearance: Turbid / S.037 / pH: x  Gluc: x / Ketone: Negative  / Bili: Negative / Urobili: Negative   Blood: x / Protein: Trace / Nitrite: Negative   Leuk Esterase: Moderate / RBC: 1 /hpf / WBC 6 /HPF   Sq Epi: x / Non Sq Epi: 6 /hpf / Bacteria: Negative      RADIOLOGY & ADDITIONAL STUDIES:    < from: CT Abdomen and Pelvis w/ IV Cont (22 @ 05:40) >  ACC: [id number] EXAM:  CT ABDOMEN AND PELVIS IC                        PROCEDURE DATE:  2022    INTERPRETATION:  CLINICAL INFORMATION: Left lower quadrant abdominal pain.  COMPARISON: Pelvic ultrasound from earlier the same day and 2018.  CONTRAST/COMPLICATIONS:  IV Contrast: Omnipaque 350  90 cc administered   10 cc discarded  Oral Contrast: NONE  Complications: None reported at time of study completion  PROCEDURE:  CT of the Abdomen and Pelvis was performed.  Sagittal and coronal reformats were performed.  FINDINGS:  LOWER CHEST: Within normal limits.  LIVER: Focal fat adjacent to the falciform ligament.  BILE DUCTS: Normal caliber.  GALLBLADDER: Within normal limits.  SPLEEN: Within normal limits.  PANCREAS: Within normal limits.  ADRENALS: Within normal limits.  KIDNEYS/URETERS: Within normal limits.  BLADDER: Within normal limits.  REPRODUCTIVE ORGANS: Intrauterine device in place. 2.0 x 1.6 cm left   ovarian corpus luteal cyst. 5.3 x 5.0 cm left paraovarian cyst. Right   ovary within normal limits.  BOWEL: No bowel obstruction or inflammation. Appendix is normal.  PERITONEUM: No ascites.  VESSELS: Circumaortic left renal vein.  RETROPERITONEUM/LYMPH NODES: No lymphadenopathy.  ABDOMINAL WALL: Within normal limits.  BONES: Within normal limits.  IMPRESSION:  1. No bowel obstruction or inflammation.  2. Small left ovarian corpus luteal cyst. Moderate-sized left paraovarian   cyst visualized on a pelvic ultrasound dating back to 2018.  --- End of Report ---  GRAHAM VIZCARRA MD; Resident Radiologist  This document has been electronically signed.  DEMARCUS BLUE MD; Attending Radiologist  This document has been electronically signed. Mar 16 2022  5:55AM  < end of copied text >

## 2022-03-16 NOTE — H&P ADULT - ASSESSMENT
35y/o  presenting to the ED complaining of abdominal pain with a known 5cm paraovarian cyst that has been stable in size for multiple years being admitted for pain. Patient in stable condition, able to ambulate, VSS, no lab abnormalities. TVUS re-demonstrates 5cm paraovarian cyst with no findings suggestive of ovarian torsion. Abdominal exam at bedside benign, however patient requiring multiple rounds of pain medication    - Admit to GYN service  - NPO  - LR@100  - IV Tylenol, IV Toradol for pain  - COVID pcr  - Call GYN team for any increase in pain  - Plan for laparoscopic cystectomy tonight vs tomorrow    D/W Dr. Dugan  ula PGY2 37y/o  presenting to the ED complaining of abdominal pain with a known 5cm paraovarian cyst that has been stable in size for multiple years being admitted for pain. Patient in stable condition, able to ambulate, VSS, no lab abnormalities. TVUS re-demonstrates 5cm paraovarian cyst with no findings suggestive of ovarian torsion. Abdominal exam at bedside benign, however patient requiring multiple rounds of pain medication    - Admit to GYN service  - NPO  - LR@100  - IV Tylenol, IV Toradol for pain  - COVID pcr  - Call GYN team for any increase in pain    D/W Dr. Ritchie Nunez PGY2

## 2022-03-16 NOTE — H&P ADULT - HISTORY OF PRESENT ILLNESS
HPI: 35y/o  presenting to the ED complaining of abdominal pain. GYN consulted for rule out ovarian torsion. Patient reports abdominal pain since last night that has worsened in intensity and severity. She reports feeling pain similar to this 5 years ago but the pain resolved spontaneously She has a known history of a paraovarian cyst that has been monitored with serial ultrasounds and has remained stable in size. She denies lightheadedness, dizziness, HA, CP, palpitations, N/V, urinary symptoms, and changes in bowel habits.    Name of GYN Physician: Dr. Hay    OBHx:     GYNHx: hx of paraovarian cyst; denies fibroids, endometriosis, STI's, Abnormal pap smears   PMHx: Denies   PSHx: Denies  Meds:  None  Allergies: NKDA

## 2022-03-16 NOTE — ED ADULT NURSE NOTE - OBJECTIVE STATEMENT
36 y.o female c/o LLQ pain since 2100 last night. Denies pain radiating anywhere. Hx ovarian torsion 3 yrs ago which self resolved as well as ovarian cysts. Denies pain radiating anywhere. Denies NVD, fever, cough, SOB, CP,  symptoms. Denies PMH. Last took Aleve around 2200 with little relief

## 2022-03-16 NOTE — ED PROVIDER NOTE - PHYSICAL EXAMINATION
Vital signs reviewed.  CONSTITUTIONAL: NAD, awake  HEAD: Normocephalic; atraumatic  EYES: EOMI, no conjunctival injection, no scleral icterus  MOUTH/THROAT:  MMM  NECK: Trachea midline  CV: Normal S1, S2; no audible murmurs; extremities WWP  RESP: normal work of breathing; CTAB, no stridor  ABD: soft, non-distended; mildly tender LLQ  : Deferred - see progress note  MSK/EXT: no edema, no limited ROM  SKIN: No rashes on exposed skin surfaces  NEURO: Moves all extremities spontaneously with no focal deficits, speech is appropriate

## 2022-03-16 NOTE — ED PROVIDER NOTE - ATTENDING CONTRIBUTION TO CARE
Patient presenting complaining of LLQ abdominal pain beginning earlier this evening.  Took NSAID at home without relief.  Reporting similar episode in past which she describes as being ovarian torsion however on further questioning she reports that pain resolved prior to imaging studies spontaneously, no prior surgeries and while there was suspicion for torsion/detorsion at that time it was never confirmed.  Denying nausea, vomiting, constipation, dysuria, hematuria.  Pain sharp, non radiating.    A 14 point review of systems is negative except as in HPI or otherwise documented.    Exam:  General: Patient well appearing but uncomfortable, vital signs within normal limits  HEENT: airway patent with moist mucous membranes  Cardiac: RRR S1/S2 with strong peripheral pulses  Respiratory: lungs clear without respiratory distress  GI: abdomen soft, mildly point tender in LLQ without rebound or guarding, no external pelvic point tenderness, non distended  Neuro: no gross neurologic deficits  Skin: warm, well perfused  Psych: normal mood and affect    Overall lower suspicion for torsion given location of reported symptoms and point tenderness.  No GI symptoms associated to suspect that this is a colitis, could be L sided appendicitis?, diverticulitis possible but less likely given age.  Will obtain labs, CT and ultrasound to further characterize, pain management and re-evaluate.

## 2022-03-16 NOTE — CONSULT NOTE ADULT - SUBJECTIVE AND OBJECTIVE BOX
Gyn Consult Note  ROBINSON WHITFIELD  36y  Female [id number]    HPI: 37y/o  presenting to the ED complaining of abdominal pain. GYN consulted for rule out ovarian torsion.  Patient reports ___. She denies lightheadedness, dizziness, HA, CP, palpitations, N/V, urinary symptoms, and changes in bowel habits.    Name of GYN Physician: Dr. Hay    OBHx:     GYNHx: Denies fibroids, cysts, endometriosis, STI's, Abnormal pap smears   PMHx: Denies   PSHx: Denies  Meds:  None  Allergies: NKDA    Vital Signs Last 24 Hrs  T(C): 36.8 (16 Mar 2022 06:17), Max: 36.8 (16 Mar 2022 06:17)  T(F): 98.2 (16 Mar 2022 06:17), Max: 98.2 (16 Mar 2022 06:17)  HR: 64 (16 Mar 2022 06:17) (64 - 87)  BP: 126/85 (16 Mar 2022 06:17) (126/85 - 148/92)  BP(mean): --  RR: 18 (16 Mar 2022 06:17) (18 - 18)  SpO2: 100% (16 Mar 2022 06:17) (100% - 100%)    Physical Exam:   General: sitting comfortably in bed, NAD   CV: RRR  Lungs: CTAB  Back: No CVA tenderness  Abd: Soft, non-tender, non-distended.  Bowel sounds present.    Ext: non-tender b/l, no edema     LABS:             13.5   9.96  )-----------( 260      ( 16 Mar 2022 03:13 )             41.3     03-16  139  |  104  |  15  ----------------------------<  116<H>  4.0   |  21<L>  |  0.74    Ca    9.6      16 Mar 2022 03:13    TPro  7.6  /  Alb  4.7  /  TBili  0.6  /  DBili  x   /  AST  15  /  ALT  11  /  AlkPhos  42  03-16    PT/INR - ( 16 Mar 2022 03:13 )   PT: 11.9 sec;   INR: 1.03 ratio       PTT - ( 16 Mar 2022 03:13 )  PTT:30.3 sec    Urinalysis Basic - ( 16 Mar 2022 06:43 )  Color: Yellow / Appearance: Turbid / S.037 / pH: x  Gluc: x / Ketone: Negative  / Bili: Negative / Urobili: Negative   Blood: x / Protein: Trace / Nitrite: Negative   Leuk Esterase: Moderate / RBC: 1 /hpf / WBC 6 /HPF   Sq Epi: x / Non Sq Epi: 6 /hpf / Bacteria: Negative        RADIOLOGY & ADDITIONAL STUDIES:    < from: CT Abdomen and Pelvis w/ IV Cont (22 @ 05:40) >  ACC: [id number] EXAM:  CT ABDOMEN AND PELVIS IC                        PROCEDURE DATE:  2022    INTERPRETATION:  CLINICAL INFORMATION: Left lower quadrant abdominal pain.  COMPARISON: Pelvic ultrasound from earlier the same day and 2018.  CONTRAST/COMPLICATIONS:  IV Contrast: Omnipaque 350  90 cc administered   10 cc discarded  Oral Contrast: NONE  Complications: None reported at time of study completion  PROCEDURE:  CT of the Abdomen and Pelvis was performed.  Sagittal and coronal reformats were performed.  FINDINGS:  LOWER CHEST: Within normal limits.  LIVER: Focal fat adjacent to the falciform ligament.  BILE DUCTS: Normal caliber.  GALLBLADDER: Within normal limits.  SPLEEN: Within normal limits.  PANCREAS: Within normal limits.  ADRENALS: Within normal limits.  KIDNEYS/URETERS: Within normal limits.  BLADDER: Within normal limits.  REPRODUCTIVE ORGANS: Intrauterine device in place. 2.0 x 1.6 cm left   ovarian corpus luteal cyst. 5.3 x 5.0 cm left paraovarian cyst. Right   ovary within normal limits.  BOWEL: No bowel obstruction or inflammation. Appendix is normal.  PERITONEUM: No ascites.  VESSELS: Circumaortic left renal vein.  RETROPERITONEUM/LYMPH NODES: No lymphadenopathy.  ABDOMINAL WALL: Within normal limits.  BONES: Within normal limits.  IMPRESSION:  1. No bowel obstruction or inflammation.  2. Small left ovarian corpus luteal cyst. Moderate-sized left paraovarian   cyst visualized on a pelvic ultrasound dating back to 2018.  --- End of Report ---  GRAHAM VIZCARRA MD; Resident Radiologist  This document has been electronically signed.  DEMARCUS BLUE MD; Attending Radiologist  This document has been electronically signed. Mar 16 2022  5:55AM  < end of copied text >          < from: US Doppler Pelvis (22 @ 04:47) >  ACC: [id number] EXAM:  US DPLX PELVIC                        ACC: [id number] EXAM:  US TRANSVAGINAL                        PROCEDURE DATE:  2022    INTERPRETATION:  CLINICAL INFORMATION: Left lower quadrant pain.  LMP: Unknown  COMPARISON: Pelvic ultrasound 2018.  TECHNIQUE: Endovaginal pelvic sonogram as per order. Transabdominal   pelvic sonogram was also performed as part of our standard protocol.   Color and Spectral Doppler was performed.  FINDINGS:  Uterus: 7.2 cmx 3.7 cm x 4.6 cm. Within normal limits.  Endometrium: 5 mm. Intrauterine device.  Right ovary: 2.7 cm x 1.4 cm x 1.3 cm. Within normal limits. Normal   arterial and venous waveforms.  Left ovary: 2.9 cm x 2.2 cm x 2.0 cm. 1.9 x 1.5 x 1.9 cm corpusluteal   cyst. Normal arterial and venous waveforms. Paraovarian cysts measuring   5.0 x 3.5 x 5.1 cm and 0.7 x 0.8 x 1.1 cm in similar configuration to the   prior examination.  Fluid: None.  IMPRESSION:  No evidence of ovarian torsion.  --- End of Report ---  GRAHAM VIZCARRA MD; Resident Radiologist  This document has been electronically signed.  DEMARCUS BLUE MD; Attending Radiologist  This document has been electronically signed. Mar 16 2022  5:11AM  < end of copied text >

## 2022-03-16 NOTE — ED ADULT NURSE REASSESSMENT NOTE - GENERAL PATIENT STATE
comfortable appearance/cooperative/improvement verbalized/family/SO at bedside/resting/sleeping
cooperative/family/SO at bedside

## 2022-03-16 NOTE — CHART NOTE - NSCHARTNOTEFT_GEN_A_CORE
R4  T OBE COMPLETED R4  Resumed care over this patient this evening. Went to bedside to evaluate patient for baseline abdominal exam.  Patient has not received any pain medication since 145pm today and is feeling improved. Denies any N/V. Notices persistent abdominal discomfort, but describes it as "manageable." No other complaints at this time.  Notes an episode of pain similar to this 4 years ago that self-resolved.  Hemodynamically stable.  Resting comfortably in bed.   Abd on exam diffusely soft, tender only with deep palpation in LLQ, otherwise NTTP. No rebound, no guarding, no rigidity.  This paratubal cyst has been present and stable >2 years.   Unknown timing of menstrual cycles with Mirena IUD in place, however TVUS revealed corpus luteum. Good flow to ovaries B/L on TVUS.  Low concern for torsion given physical exam and radiographic findings.  Patient continues to feel improved. Will re-eval when patient requires pain meds.  Attending Dr. Dugan en route to evaluate patient.    BHAVIK English PGY4  dw Dr. Dugan

## 2022-03-16 NOTE — ED PROVIDER NOTE - CLINICAL SUMMARY MEDICAL DECISION MAKING FREE TEXT BOX
36F pmh ovarian torsion who presents with LLQ pain.  DDx renal colic, ovarian torsion, would also consider PID and TOA.  Will perform pevlic exam with chaperone, GC/chlamydia swab, get TVUS, labs, CT abd/pelv, dispo pending workup.

## 2022-03-16 NOTE — CHART NOTE - NSCHARTNOTEFT_GEN_A_CORE
R2 Chart note     Vital Signs Last 24 Hrs  T(C): 36.6 (16 Mar 2022 16:25), Max: 36.8 (16 Mar 2022 06:17)  T(F): 97.8 (16 Mar 2022 16:25), Max: 98.2 (16 Mar 2022 06:17)  HR: 67 (16 Mar 2022 16:25) (64 - 87)  BP: 107/72 (16 Mar 2022 16:25) (107/72 - 148/92)  BP(mean): --  RR: 18 (16 Mar 2022 16:25) (18 - 18)  SpO2: 98% (16 Mar 2022 16:25) (98% - 100%)                          13.5   9.96  )-----------( 260      ( 16 Mar 2022 03:13 )             41.3 R2 Chart note     Pt seen at bedside for abdominal examination and unchanged from previous 1-2 hours ago. Patient reporting similar pain as previous and denies other symptoms. Denies fevers, headaches, CP, SOB, edema. Patient resting in bed and requesting pain medication. Last Tylenol at 2p, last Toradol this morning, last Dilaudid at 145p    Vital Signs Last 24 Hrs  T(C): 36.6 (16 Mar 2022 16:25), Max: 36.8 (16 Mar 2022 06:17)  T(F): 97.8 (16 Mar 2022 16:25), Max: 98.2 (16 Mar 2022 06:17)  HR: 67 (16 Mar 2022 16:25) (64 - 87)  BP: 107/72 (16 Mar 2022 16:25) (107/72 - 148/92)  BP(mean): --  RR: 18 (16 Mar 2022 16:25) (18 - 18)  SpO2: 98% (16 Mar 2022 16:25) (98% - 100%)    Gen NAD, resting comfortably in bed   CV Regular  Pulm comfortable on RA  Abd soft, no rebound/guarding, LLQ tenderness  Ext nontender b/l                         13.5   9.96  )-----------( 260      ( 16 Mar 2022 03:13 )             41.3      A/P  37y/o  presenting to the ED complaining of abdominal pain with a known 5cm paraovarian cyst that has been stable in size for multiple years being admitted for pain. Patient in stable condition, able to ambulate, VSS, no lab abnormalities. TVUS re-demonstrates 5cm paraovarian cyst with no findings suggestive of ovarian torsion. Abdominal exam at bedside remains benign     - to be evaluated by Dr. Dugan, then consider pain medication  - continue to monitor pain   - continue to closely monitor VS   - NPO, IV Fluids    d/w Dr. Ritchie Adams PGY2 R2 Chart note     Pt seen at bedside for abdominal examination and unchanged from previous 1-2 hours ago. Patient reporting similar pain as previous and denies other symptoms. Denies fevers, headaches, CP, SOB, edema. Patient resting in bed and requesting pain medication. Last Tylenol at 2p, last Toradol this morning, last Dilaudid at 145p    Vital Signs Last 24 Hrs  T(C): 36.6 (16 Mar 2022 16:25), Max: 36.8 (16 Mar 2022 06:17)  T(F): 97.8 (16 Mar 2022 16:25), Max: 98.2 (16 Mar 2022 06:17)  HR: 67 (16 Mar 2022 16:25) (64 - 87)  BP: 107/72 (16 Mar 2022 16:25) (107/72 - 148/92)  BP(mean): --  RR: 18 (16 Mar 2022 16:25) (18 - 18)  SpO2: 98% (16 Mar 2022 16:25) (98% - 100%)    Gen NAD, resting comfortably in bed   CV Regular  Pulm comfortable on RA  Abd soft, no rebound/guarding, LLQ tenderness  Ext nontender b/l                         13.5   9.96  )-----------( 260      ( 16 Mar 2022 03:13 )             41.3      < from: US Transvaginal (22 @ 04:47) >    IMPRESSION:  No evidence of ovarian torsion.      < end of copied text >      A/P  37y/o  presenting to the ED complaining of abdominal pain with a known 5cm paraovarian cyst that has been stable in size for multiple years being admitted for pain. Patient in stable condition, able to ambulate, VSS, no lab abnormalities. TVUS re-demonstrates 5cm paraovarian cyst with no findings suggestive of ovarian torsion. Abdominal exam at bedside remains benign     - to be evaluated by Dr. Dugan, then consider pain medication  - continue to monitor pain   - continue to closely monitor VS   - NPO, IV Fluids    d/w Dr. Ritchie Adams PGY2

## 2022-03-16 NOTE — ED ADULT NURSE REASSESSMENT NOTE - COMFORT CARE
plan of care explained/side rails up/treatment delay explained/wait time explained/warm blanket provided
darkened lights/plan of care explained/side rails up/wait time explained/warm blanket provided

## 2022-03-16 NOTE — ED PROVIDER NOTE - NS ED ROS FT
In additional the that documented in the HPI, the additional ROS was obtained:    CONSTITUTIONAL: No fever, no chills  EYES: no change in vision, no blurred vision  HEENT: no trouble swallowing, no trouble speaking, no sore throat  CV: no chest pain, no palpitations  RESP: no cough, no shortness of breath  GI: +abdominal pain, no nausea, no vomiting, no diarrhea, no constipation  : No dysuria, no frequency  NEURO: no headache, no new numbness, no weakness, no dizziness  SKIN: no new rashes  HEME: No easy bruising or bleeding  MSK: no recent trauma  Luis Covarrubias M.D. -Resident

## 2022-03-16 NOTE — ED ADULT TRIAGE NOTE - CHIEF COMPLAINT QUOTE
Pt c/o LLQ abdomen beginning around 9pm, and preventing pt from sleep. Pt endorses concern for ovarian torsion r/t hx ovarian torsion. Denies N/V, and no relief with Aleeve.

## 2022-03-16 NOTE — CHART NOTE - NSCHARTNOTEFT_GEN_A_CORE
R4  Notified by nursing that patient is requesting to speak with a physician. She reports R4   Notified by nursing btwn 9-10pm that patient is requesting to speak with a physician immediately regarding pain management as patient now complaining of 9/10 pain. PGY2 Angie went to bedside immediately and found patient sleeping comfortably.  She reports that the "excruciating pain" returned again after she ate some salma crackers but it has now again resolved.   I went to bedside shortly after to reevaluate. Patient overall, patient well-appearing. Patient found in bed resting comfortably on the phone with her .   Reports no changes in overall clinical state and said she does not want surgery overnight.  Abdominal exam benign and unchanged from prior exam. Abdomen soft, NTTP at this time. No rebound, no guarding, no rigidity.   Patient conversing throughout exam.  Of note, patient's  calling [facility] multiple times. Notified patient with  on the phone that overall plans of care to be dictated by private physician.  Patient aware that narcotics to be used sparingly and is not requesting them at this time.  All questions answered to patient satisfaction. Will continue to observe overnight. Low concern R4   Notified by nursing btwn 9-10pm that patient is requesting to speak with a physician immediately regarding pain management as patient now complaining of 9/10 pain. PGY2 Angie went to bedside immediately and found patient sleeping comfortably.  She reports that the "excruciating pain" returned again after she ate some salma crackers but it has now again resolved.   I went to bedside shortly after to reevaluate. Patient overall well-appearing. Patient found in bed resting comfortably on the phone with her .   Reports no changes in overall clinical state and said she does not want surgery overnight.  Abdominal exam benign and unchanged from prior exam. Abdomen soft, NTTP at this time. No rebound, no guarding, no rigidity.   Patient conversing throughout exam.  Of note, patient's  calling [facility] multiple times. Notified patient with  on the phone that overall plans of care to be dictated by private physician.  Patient aware that narcotics to be used sparingly and is not requesting them at this time.  All questions answered to patient satisfaction. Will continue to observe overnight. Low concern for torsion at this time.    BHAVIK English PGY4  dw Dr. Dugan

## 2022-03-17 ENCOUNTER — TRANSCRIPTION ENCOUNTER (OUTPATIENT)
Age: 37
End: 2022-03-17

## 2022-03-17 VITALS
RESPIRATION RATE: 18 BRPM | OXYGEN SATURATION: 99 % | HEART RATE: 82 BPM | SYSTOLIC BLOOD PRESSURE: 102 MMHG | DIASTOLIC BLOOD PRESSURE: 71 MMHG | TEMPERATURE: 99 F

## 2022-03-17 DIAGNOSIS — Z98.890 OTHER SPECIFIED POSTPROCEDURAL STATES: ICD-10-CM

## 2022-03-17 PROBLEM — Z00.00 ENCOUNTER FOR PREVENTIVE HEALTH EXAMINATION: Status: ACTIVE | Noted: 2022-03-17

## 2022-03-17 LAB
HCT VFR BLD CALC: 39 % — SIGNIFICANT CHANGE UP (ref 34.5–45)
HGB BLD-MCNC: 12.8 G/DL — SIGNIFICANT CHANGE UP (ref 11.5–15.5)
MCHC RBC-ENTMCNC: 27.2 PG — SIGNIFICANT CHANGE UP (ref 27–34)
MCHC RBC-ENTMCNC: 32.8 GM/DL — SIGNIFICANT CHANGE UP (ref 32–36)
MCV RBC AUTO: 83 FL — SIGNIFICANT CHANGE UP (ref 80–100)
N GONORRHOEA RRNA SPEC QL NAA+PROBE: SIGNIFICANT CHANGE UP
NRBC # BLD: 0 /100 WBCS — SIGNIFICANT CHANGE UP (ref 0–0)
PLATELET # BLD AUTO: 239 K/UL — SIGNIFICANT CHANGE UP (ref 150–400)
RBC # BLD: 4.7 M/UL — SIGNIFICANT CHANGE UP (ref 3.8–5.2)
RBC # FLD: 12.9 % — SIGNIFICANT CHANGE UP (ref 10.3–14.5)
SPECIMEN SOURCE: SIGNIFICANT CHANGE UP
WBC # BLD: 10.4 K/UL — SIGNIFICANT CHANGE UP (ref 3.8–10.5)
WBC # FLD AUTO: 10.4 K/UL — SIGNIFICANT CHANGE UP (ref 3.8–10.5)

## 2022-03-17 PROCEDURE — 86901 BLOOD TYPING SEROLOGIC RH(D): CPT

## 2022-03-17 PROCEDURE — 88302 TISSUE EXAM BY PATHOLOGIST: CPT | Mod: 26

## 2022-03-17 PROCEDURE — 84132 ASSAY OF SERUM POTASSIUM: CPT

## 2022-03-17 PROCEDURE — 36415 COLL VENOUS BLD VENIPUNCTURE: CPT

## 2022-03-17 PROCEDURE — G1004: CPT

## 2022-03-17 PROCEDURE — 99285 EMERGENCY DEPT VISIT HI MDM: CPT | Mod: 25

## 2022-03-17 PROCEDURE — 80053 COMPREHEN METABOLIC PANEL: CPT

## 2022-03-17 PROCEDURE — 85730 THROMBOPLASTIN TIME PARTIAL: CPT

## 2022-03-17 PROCEDURE — 74177 CT ABD & PELVIS W/CONTRAST: CPT | Mod: MG

## 2022-03-17 PROCEDURE — C9399: CPT

## 2022-03-17 PROCEDURE — 83605 ASSAY OF LACTIC ACID: CPT

## 2022-03-17 PROCEDURE — 81001 URINALYSIS AUTO W/SCOPE: CPT

## 2022-03-17 PROCEDURE — 86850 RBC ANTIBODY SCREEN: CPT

## 2022-03-17 PROCEDURE — 84702 CHORIONIC GONADOTROPIN TEST: CPT

## 2022-03-17 PROCEDURE — 82435 ASSAY OF BLOOD CHLORIDE: CPT

## 2022-03-17 PROCEDURE — 82947 ASSAY GLUCOSE BLOOD QUANT: CPT

## 2022-03-17 PROCEDURE — U0003: CPT

## 2022-03-17 PROCEDURE — 82803 BLOOD GASES ANY COMBINATION: CPT

## 2022-03-17 PROCEDURE — 82330 ASSAY OF CALCIUM: CPT

## 2022-03-17 PROCEDURE — 86900 BLOOD TYPING SEROLOGIC ABO: CPT

## 2022-03-17 PROCEDURE — 96376 TX/PRO/DX INJ SAME DRUG ADON: CPT

## 2022-03-17 PROCEDURE — 93975 VASCULAR STUDY: CPT

## 2022-03-17 PROCEDURE — 85610 PROTHROMBIN TIME: CPT

## 2022-03-17 PROCEDURE — 96375 TX/PRO/DX INJ NEW DRUG ADDON: CPT

## 2022-03-17 PROCEDURE — 84295 ASSAY OF SERUM SODIUM: CPT

## 2022-03-17 PROCEDURE — 88304 TISSUE EXAM BY PATHOLOGIST: CPT

## 2022-03-17 PROCEDURE — 87591 N.GONORRHOEAE DNA AMP PROB: CPT

## 2022-03-17 PROCEDURE — 76830 TRANSVAGINAL US NON-OB: CPT

## 2022-03-17 PROCEDURE — 88304 TISSUE EXAM BY PATHOLOGIST: CPT | Mod: 26

## 2022-03-17 PROCEDURE — 85014 HEMATOCRIT: CPT

## 2022-03-17 PROCEDURE — 85027 COMPLETE CBC AUTOMATED: CPT

## 2022-03-17 PROCEDURE — 85018 HEMOGLOBIN: CPT

## 2022-03-17 PROCEDURE — 88302 TISSUE EXAM BY PATHOLOGIST: CPT

## 2022-03-17 PROCEDURE — 96374 THER/PROPH/DIAG INJ IV PUSH: CPT

## 2022-03-17 RX ORDER — ONDANSETRON 8 MG/1
4 TABLET, FILM COATED ORAL EVERY 4 HOURS
Refills: 0 | Status: DISCONTINUED | OUTPATIENT
Start: 2022-03-17 | End: 2022-03-17

## 2022-03-17 RX ORDER — OXYCODONE HYDROCHLORIDE 5 MG/1
1 TABLET ORAL
Qty: 5 | Refills: 0
Start: 2022-03-17

## 2022-03-17 RX ORDER — ACETAMINOPHEN 500 MG
2 TABLET ORAL
Qty: 0 | Refills: 0 | DISCHARGE

## 2022-03-17 RX ORDER — ONDANSETRON 8 MG/1
1 TABLET, FILM COATED ORAL
Qty: 10 | Refills: 0
Start: 2022-03-17

## 2022-03-17 RX ORDER — IBUPROFEN 200 MG
1 TABLET ORAL
Qty: 0 | Refills: 0 | DISCHARGE

## 2022-03-17 RX ORDER — ACETAMINOPHEN 500 MG
975 TABLET ORAL EVERY 6 HOURS
Refills: 0 | Status: DISCONTINUED | OUTPATIENT
Start: 2022-03-17 | End: 2022-03-17

## 2022-03-17 RX ORDER — DOCUSATE SODIUM 100 MG
1 CAPSULE ORAL
Qty: 0 | Refills: 0 | DISCHARGE

## 2022-03-17 RX ORDER — SIMETHICONE 80 MG/1
1 TABLET, CHEWABLE ORAL
Qty: 0 | Refills: 0 | DISCHARGE

## 2022-03-17 RX ORDER — FENTANYL CITRATE 50 UG/ML
50 INJECTION INTRAVENOUS
Refills: 0 | Status: DISCONTINUED | OUTPATIENT
Start: 2022-03-17 | End: 2022-03-17

## 2022-03-17 RX ORDER — FENTANYL CITRATE 50 UG/ML
25 INJECTION INTRAVENOUS
Refills: 0 | Status: DISCONTINUED | OUTPATIENT
Start: 2022-03-17 | End: 2022-03-17

## 2022-03-17 RX ORDER — SODIUM CHLORIDE 9 MG/ML
1000 INJECTION, SOLUTION INTRAVENOUS
Refills: 0 | Status: DISCONTINUED | OUTPATIENT
Start: 2022-03-17 | End: 2022-03-17

## 2022-03-17 RX ADMIN — Medication 975 MILLIGRAM(S): at 05:59

## 2022-03-17 RX ADMIN — Medication 975 MILLIGRAM(S): at 18:39

## 2022-03-17 RX ADMIN — SODIUM CHLORIDE 125 MILLILITER(S): 9 INJECTION, SOLUTION INTRAVENOUS at 14:23

## 2022-03-17 RX ADMIN — Medication 975 MILLIGRAM(S): at 19:30

## 2022-03-17 NOTE — DISCHARGE NOTE PROVIDER - NSDCMRMEDTOKEN_GEN_ALL_CORE_FT
Colace 100 mg oral capsule: 1 cap(s) orally 2 times a day  Motrin 600 mg oral tablet: 1 tab(s) orally every 6 hours  ondansetron 4 mg oral tablet, disintegratin tab(s) orally every 8 hours, As Needed -for nausea   oxyCODONE 5 mg oral tablet: 1 tab(s) orally every 6 hours, As Needed -for severe pain MDD:4   simethicone 80 mg oral tablet, chewable: 1 tab(s) orally 4 times a day (after meals and at bedtime)  Tylenol 500 mg oral tablet: 2 tab(s) orally every 6 hours

## 2022-03-17 NOTE — DISCHARGE NOTE PROVIDER - NSDCCPCAREPLAN_GEN_ALL_CORE_FT
PRINCIPAL DISCHARGE DIAGNOSIS  Diagnosis: Left lower quadrant pain  Assessment and Plan of Treatment:

## 2022-03-17 NOTE — PROGRESS NOTE ADULT - SUBJECTIVE AND OBJECTIVE BOX
R4 GYN Progress Note    HD#2    Interval Events: patient with improved pain overnight. No nausea or vomiting.    Patient seen and examined at bedside. No acute events overnight. No acute complaints.  Pain manageable without medication.  Patient is ambulating and tolerating regular diet  Denies CP, SOB, N/V, fevers, and chills.    Vital Signs Last 24 Hours  T(C): 36.7 (03-17-22 @ 05:09), Max: 36.9 (03-16-22 @ 21:00)  HR: 82 (03-17-22 @ 05:09) (66 - 82)  BP: 106/68 (03-17-22 @ 05:09) (106/68 - 124/78)  RR: 18 (03-17-22 @ 05:09) (18 - 18)  SpO2: 99% (03-17-22 @ 05:09) (98% - 100%)    I&O's Summary    Physical Exam:  General: NAD  Abdomen: Soft, TTP LLQ, no rebound, voluntary guarding  : no bleeding on pad  Ext: No pain or swelling     Labs:                        13.5   9.96  )-----------( 260      ( 16 Mar 2022 03:13 )             41.3   baso x      eos x      imm gran x      lymph x      mono x      poly x          MEDICATIONS  (STANDING):  acetaminophen     Tablet .. 975 milliGRAM(s) Oral every 6 hours  ibuprofen  Tablet. 600 milliGRAM(s) Oral every 6 hours  simethicone 80 milliGRAM(s) Chew daily    MEDICATIONS  (PRN):

## 2022-03-17 NOTE — PROGRESS NOTE ADULT - PROBLEM SELECTOR PLAN 1
Neuro: PO Analgesia PRN    CV: Hemodynamically stable.  Monitor VS.    Pulm: Saturating well on room air.  Encourage OOB and incentive spirometer use.   GI: Advance to regular diet. Anti-emetics PRN.  : DTV by 830pm  FEN: Electrolytes: LR@125cc/hr.   Heme: DVT ppx w/ SCD's while in bed. Early ambulation, initially with assistance then as tolerated.    ID: Afebrile  Endo: No active issues   Dispo: Discharge from PACU to home when criteria met.

## 2022-03-17 NOTE — DISCHARGE NOTE PROVIDER - NSDCFUADDINST_GEN_ALL_CORE_FT
Postoperative Instructions   Pain control     For pain control, take the followin. Motrin 600mg four times a day, take with food  2. Add Tylenol 975 four times a day, alternated with motrin  3. Add oxycodone as needed for severe pain not managed well by motrin and tylenol. A prescription has been sent to your pharmacy on file.     Motrin and Tylenol can be obtained over the counter.       Bowel regimen  To ease the discomfort associated with the return of bowel function after surgery, you may also take the followin. Simethicone 80mg four times daily for gas pain  2. Colace two tabs at night before bedtime for constipation. If you are taking oxycodone or other opiates, please continue taking colace as well.  3. Zofran 4mg oral disintegrating tablets every 8 hours for nausea.    Simethicone and Colace can be obtained over the counter. Zofran has been sent to your pharmacy on file.      Incision Care  Keep your incision(s) clean and dry. It is ok to shower, but do not scrub the incision sites--just allow the water to gently wash over your skin. There is surgical glue over the incision sites that may be removed at your postoperative visit      Postoperative restrictions   Do not drive or make important decisions for 24 hours after anesthesia. You may feel drowsy for 24 hours and should have a responsible adult with you during that time. Nothing in the vagina (tampons, sexual intercourse), No tub baths, pools or hot tubs for 6 weeks (showers are ok!). No lifting anything heavier than 15 lbs, no strenuous exercise for 6 weeks after surgery. Do not pull or cut any stitches that you see around your incision.      Postoperative Infection  Some postoperative pain and discomfort is normal. However, if you experience any of the following, you may be developing an infection and should be seen by your doctor: pain that does not get better with the oral medications listed above, fever greater than 100.4F, foul smelling discharge coming from the surgical site, nausea and vomiting that does not stop (especially if you are unable to tolerate oral intake), or inability to urinate. If you experience any of these symptoms, call your doctor's office to be seen right away.    Follow Up  Call Dr. Dugan's office to schedule a postoperative appointment in 2 weeks. The results from the procedure will be discussed with you at that time.

## 2022-03-17 NOTE — DISCHARGE NOTE PROVIDER - HOSPITAL COURSE
Patient was admitted 3/16/2022 with left lower quadrant pain in the setting of a known left paratubal cyst requiring opiate medication. Pelvic ultrasound redemonstrated 6cm cyst but was otherwise unremarkable, and patient was admitted for pain control. The pain did not resolve over 24h, and patient was taken to the OR where diagnostic laparoscopy showed a large left paratubal cyst causing fallopian tube torsion. Left salpingectomy was performed without complication (see operative note for details), and patient was discharged on POD#0 (HD#2) in stable condition, voiding spontaneously, ambulating, tolerating PO. She is to have close postoperative follow up with Dr. Dugan.    Labs Trend  WBC: 9.96->10.40  Hct: 41.3->39.0   Patient was admitted 3/16/2022 with left lower quadrant pain in the setting of a known left paratubal cyst requiring opiate medication. Pelvic ultrasound redemonstrated 6cm cyst with good ovarian flow and no signs of torsion, and patient was admitted for pain control. The pain improved the evening of 3/16 but remained dull overnight. Decision was made to take patient to the OR on 3/17 where diagnostic laparoscopy showed a large left paratubal cyst causing fallopian tube torsion. Left salpingectomy was performed without complication and cyst was removed (see operative note for details). Patient was discharged on POD#0 (HD#2) in stable condition, voiding spontaneously, ambulating, tolerating PO. She is to have close postoperative follow up with Dr. Dugan.    Labs Trend  WBC: 9.96->10.40  Hct: 41.3->39.0

## 2022-03-17 NOTE — DISCHARGE NOTE NURSING/CASE MANAGEMENT/SOCIAL WORK - PATIENT PORTAL LINK FT
You can access the FollowMyHealth Patient Portal offered by [facility] by registering at the following website: [email or web address]. By joining [facility]’s FollowMyHealth portal, you will also be able to view your health information using other applications (apps) compatible with our system.

## 2022-03-17 NOTE — DISCHARGE NOTE PROVIDER - CARE PROVIDER_API CALL
Sofi Dugan)  [facility] Obstetrics [facility] Gynecology  [address] [place] [address], [address]  [place] [address]  Phone: [phone]  Fax: [phone]  Follow Up Time: 2 weeks

## 2022-03-17 NOTE — DISCHARGE NOTE NURSING/CASE MANAGEMENT/SOCIAL WORK - NSDCVIVACCINE_GEN_ALL_CORE_FT
influenza, injectable, quadrivalent, preservative free; 24-Sep-2016 06:48; Tyra Oh (PAULY); [employer]; [id number]; IntraMuscular; Deltoid Left.; 0.5 milliLiter(s); VIS (VIS Published: 07-Aug-2015, VIS Presented: 24-Sep-2016);   Tdap; 24-Sep-2016 06:45; Tyra Oh (RN); [employer]; [id number]; IntraMuscular; Deltoid Left.; 0.5 milliLiter(s); VIS (VIS Published: 09-May-2013, VIS Presented: 24-Sep-2016);

## 2022-03-17 NOTE — DISCHARGE NOTE NURSING/CASE MANAGEMENT/SOCIAL WORK - NSDCPEFALRISK_GEN_ALL_CORE
For information on Fall & Injury Prevention, visit: [email or web address] OR  [email or web address] OR  https://www.cdc.gov/steadi/patient.html

## 2022-03-17 NOTE — PROGRESS NOTE ADULT - ASSESSMENT
36y with known peritubal cyst admitted for pain control per attending. Patient's pain has improved without pain medication; however it is persistent. Differential includes tubal torsion v ruptured cyst v GI etiology. Patient's abdominal exam is overall benign, vitals stable, possible candidate for outpatient management v expedited surgical management.    Neuro: PO pain meds PRN  CV: Hemodynamically stable  Pulm: Saturating well on room air  GI: Continue regular diet  : Voiding spontaneously   Heme: c/w SCDs for DVT ppx  FEN: SLIV   ID: Afebrile  Endo: No active issues   Dispo: Discharge planning, OR scheduling    Felicita Sagastume MD PGY4
A/P: 36y Female s/p Laparoscopic left salpingectomy, removal of paratubal cyst, EBL 20ml

## 2022-03-17 NOTE — DISCHARGE NOTE PROVIDER - NSDCCPTREATMENT_GEN_ALL_CORE_FT
PRINCIPAL PROCEDURE  Procedure: Laparoscopic left salpingectomy  Findings and Treatment:       SECONDARY PROCEDURE  Procedure: Laparoscopic surgical removal of paratubal cyst  Findings and Treatment:

## 2022-03-17 NOTE — PROGRESS NOTE ADULT - SUBJECTIVE AND OBJECTIVE BOX
PA POST-OP CHECK    S: Patient seen and evaluated at bedside.  Pt awake and alert resting comfortably in bed  Patient reports pain controlled with analgesia. Pt denies N/V, SOB, CP, palpitations. Tolerating clears.  Not OOB yet.    O:   T(C): 36 (03-17-22 @ 12:56), Max: 36.6 (03-17-22 @ 10:40)  HR: 73 (03-17-22 @ 14:00) (68 - 78)  BP: 95/58 (03-17-22 @ 14:00) (92/59 - 114/78)  RR: 16 (03-17-22 @ 14:00) (14 - 18)  SpO2: 96% (03-17-22 @ 14:00) (96% - 100%)  I&O's Summary    17 Mar 2022 07:01  -  17 Mar 2022 14:27  --------------------------------------------------------  IN: 125 mL / OUT: 0 mL / NET: 125 mL        Gen: Resting comfortably in bed, NAD  CV: S1S2, RRR  Lungs: CTA B/L  Abd: soft, appropriately tender, occassional BS x 4 quadrants.    Inc: opsite x 3, clean/dry/intact   Ext: SCD's in place and functional, non-tender b/l, no edema

## 2022-03-17 NOTE — BRIEF OPERATIVE NOTE - OPERATION/FINDINGS
Exam under anesthesia revealed small, midposition uterus wit L adnexal fullness  Abdominal survey showed atraumatic entry, clear liver edge, grossly normal abdominal organs  Grossly normal uterus, R fallopian tube, and bilateral ovaries  L fallopian tube with 6-7cm hemorrhagic and necrotic paratubal cyst near fimbria, torsed  No ovarian torsion Exam under anesthesia revealed small, midposition uterus wit L adnexal fullness  Abdominal survey showed atraumatic entry, clear liver edge, grossly normal abdominal organs  Grossly normal uterus, R fallopian tube, and bilateral ovaries  L fallopian tube with 6-7cm hemorrhagic and necrotic paratubal cyst near fimbria, torsed  No ovarian torsion  left ovarian appeared well perfused

## 2022-03-17 NOTE — BRIEF OPERATIVE NOTE - NSICDXBRIEFPROCEDURE_GEN_ALL_CORE_FT
PROCEDURES:  Laparoscopic left salpingectomy 17-Mar-2022 13:41:10  Felicita Sagastume  Laparoscopic surgical removal of paratubal cyst 17-Mar-2022 13:41:21  Felicita Sagastume

## 2022-03-17 NOTE — PROGRESS NOTE ADULT - ATTENDING COMMENTS
Agree with above  Patient seen at bedside  Patient states her pain is "constant" but duller now compared to when she initially presented to hospital >24 hours ago.  She states she was able to sleep on and off over night  She has only required PO tylenol and motrin  She denies any n/v    On exam, she is in NAD and is lying in bed  Abdomen is soft, minimally tender in LLQ, no guarding and no rebound    Discussed with patient that she is not demonstrating signs of ovarian torsion. However, patient demands surgery today despite no emergent reason. She asked that I speak with her father who is a physician. I called him and he was immediately hostile towards me --- telling me that I am the cause of the problem. I discussed with him that her daughter does not have a torsed ovary and thus there is no reason to do an emergency surgery. I explained that her abdominal exam has been benign over the past 12 hours and she has not required any narcotics. However he then threatened to call the hospital legal team and his . I said he would call for a second opinion. I told him I would call the operating room and have her added on to the schedule. He then ended the call.    liznadro garcia

## 2022-03-28 NOTE — ED ADULT NURSE NOTE - CARDIO WDL
Normal rate, regular rhythm, normal S1, S2 heart sounds heard. <-- Click to add NO pertinent Past Medical History

## 2022-04-01 LAB — SURGICAL PATHOLOGY STUDY: SIGNIFICANT CHANGE UP

## 2022-05-12 NOTE — PRE-ANESTHESIA EVALUATION ADULT - NSANTHOSAYNRD_GEN_A_CORE
No. SARAH screening performed.  STOP BANG Legend: 0-2 = LOW Risk; 3-4 = INTERMEDIATE Risk; 5-8 = HIGH Risk Handoff

## 2022-05-21 PROBLEM — N83.519 TORSION OF OVARY AND OVARIAN PEDICLE, UNSPECIFIED SIDE: Chronic | Status: ACTIVE | Noted: 2022-03-16

## 2022-05-22 ENCOUNTER — APPOINTMENT (OUTPATIENT)
Dept: ORTHOPEDIC SURGERY | Facility: CLINIC | Age: 37
End: 2022-05-22
Payer: COMMERCIAL

## 2022-05-22 VITALS — BODY MASS INDEX: 24.16 KG/M2 | HEIGHT: 65 IN | WEIGHT: 145 LBS

## 2022-05-22 PROCEDURE — 99203 OFFICE O/P NEW LOW 30 MIN: CPT

## 2022-05-22 PROCEDURE — 72040 X-RAY EXAM NECK SPINE 2-3 VW: CPT

## 2022-05-22 RX ORDER — METHYLPREDNISOLONE 4 MG/1
4 TABLET ORAL
Qty: 1 | Refills: 0 | Status: ACTIVE | COMMUNITY
Start: 2022-05-22 | End: 1900-01-01

## 2022-05-22 NOTE — HISTORY OF PRESENT ILLNESS
[3] : 3 [7] : 7 [Tightness] : tightness [Ice] : ice [de-identified] : 5/22/22: Pt sleeping in bed and woke up with pain in back of neck on 4/30/22. Pain worsened since. [] : no [FreeTextEntry6] : stiffness  [FreeTextEntry7] : ricki shoulder  [de-identified] : lying, keeping neck in same position for a while

## 2022-05-22 NOTE — PHYSICAL EXAM
[] : light touch intact throughout both upper extremities [Straightening consistent with spasm] : Straightening consistent with spasm

## 2022-05-22 NOTE — ASSESSMENT
[FreeTextEntry1] : new onset neck pain. worse on left. some clicking right side after sleeping around 4/30/22.  possible muscle strain, hnp.  \par \par mom.\par denies other pmhx.

## 2022-06-17 ENCOUNTER — APPOINTMENT (OUTPATIENT)
Dept: ORTHOPEDIC SURGERY | Facility: CLINIC | Age: 37
End: 2022-06-17

## 2022-07-17 ENCOUNTER — APPOINTMENT (OUTPATIENT)
Dept: ORTHOPEDIC SURGERY | Facility: CLINIC | Age: 37
End: 2022-07-17

## 2022-07-17 VITALS — WEIGHT: 145 LBS | BODY MASS INDEX: 24.16 KG/M2 | HEIGHT: 65 IN

## 2022-07-17 PROCEDURE — 99213 OFFICE O/P EST LOW 20 MIN: CPT

## 2022-07-17 PROCEDURE — 72040 X-RAY EXAM NECK SPINE 2-3 VW: CPT

## 2022-07-17 RX ORDER — METHYLPREDNISOLONE 4 MG/1
4 TABLET ORAL
Qty: 1 | Refills: 0 | Status: ACTIVE | COMMUNITY
Start: 2022-07-17 | End: 1900-01-01

## 2022-07-17 NOTE — HISTORY OF PRESENT ILLNESS
[3] : 3 [7] : 7 [Tightness] : tightness [Ice] : ice [de-identified] : 5/22/22: Pt sleeping in bed and woke up with pain in back of neck on 4/30/22. Pain worsened since.\par 7/17/22: was better and now worse again.  a lot of pain in neck, shoulders. [] : no [FreeTextEntry5] : pt c.o pain in shoulder/neck pain for about 10 days states she went bike riding \par denies N/t in hands  [FreeTextEntry6] : stiffness  [FreeTextEntry7] : ricki shoulder  [de-identified] : lying, keeping neck in same position for a while

## 2022-07-17 NOTE — ASSESSMENT
[FreeTextEntry1] : new onset neck pain. worse on left. some clicking right side after sleeping around 4/30/22.  possible muscle strain, hnp.  was better on mdp and Pt and then got worse again.  \par \par mom.\par denies other pmhx.

## 2022-07-21 ENCOUNTER — FORM ENCOUNTER (OUTPATIENT)
Age: 37
End: 2022-07-21

## 2022-07-22 ENCOUNTER — APPOINTMENT (OUTPATIENT)
Dept: MRI IMAGING | Facility: CLINIC | Age: 37
End: 2022-07-22

## 2022-07-22 PROCEDURE — 72141 MRI NECK SPINE W/O DYE: CPT

## 2022-07-29 ENCOUNTER — APPOINTMENT (OUTPATIENT)
Dept: ORTHOPEDIC SURGERY | Facility: CLINIC | Age: 37
End: 2022-07-29

## 2022-07-29 VITALS — BODY MASS INDEX: 24.16 KG/M2 | HEIGHT: 65 IN | WEIGHT: 145 LBS

## 2022-07-29 DIAGNOSIS — S46.011D STRAIN OF MUSCLE(S) AND TENDON(S) OF THE ROTATOR CUFF OF RIGHT SHOULDER, SUBSEQUENT ENCOUNTER: ICD-10-CM

## 2022-07-29 DIAGNOSIS — M54.2 CERVICALGIA: ICD-10-CM

## 2022-07-29 DIAGNOSIS — Z78.9 OTHER SPECIFIED HEALTH STATUS: ICD-10-CM

## 2022-07-29 DIAGNOSIS — M50.20 OTHER CERVICAL DISC DISPLACEMENT, UNSPECIFIED CERVICAL REGION: ICD-10-CM

## 2022-07-29 PROCEDURE — 99214 OFFICE O/P EST MOD 30 MIN: CPT

## 2022-07-29 RX ORDER — IBUPROFEN 600 MG/1
600 TABLET, FILM COATED ORAL
Qty: 60 | Refills: 0 | Status: ACTIVE | COMMUNITY
Start: 2022-07-29 | End: 1900-01-01

## 2022-07-29 NOTE — ASSESSMENT
[FreeTextEntry1] : new onset neck pain. worse on left. some clicking right side after sleeping around 4/30/22. mri shows hnp. mild.\par \par some posterior shoulder pain and trap pain.  also some shoulder tendonitis.\par \par mom.\par denies other pmhx.

## 2022-07-29 NOTE — PHYSICAL EXAM
[Straightening consistent with spasm] : Straightening consistent with spasm [Right] : right shoulder [4 ___] : forward flexion 4[unfilled]/5 [4___] : abduction 4[unfilled]/5 [5___] : external rotation 5[unfilled]/5 [] : positive impingement testing [TWNoteComboBox7] : active forward flexion 170 degrees

## 2022-09-02 ENCOUNTER — APPOINTMENT (OUTPATIENT)
Dept: ORTHOPEDIC SURGERY | Facility: CLINIC | Age: 37
End: 2022-09-02

## 2023-06-05 NOTE — PATIENT PROFILE OB - SPIRITUAL CULTURAL, RELIGIOUS PRACTICES/VALUES, PROFILE
Patient arrives to infusion for antibiotic dose of Invanz for lung abscess. Denies new issues or concerns today. Labs collected via PICC. Invanz infused per order, patient tolerated well. PICC flushed with NS and alcohol cap applied. Patient discharged stable, aware of upcoming appts via [email or web address]. Patient reports that she will call to make ID f/u appt today.     EOT: 6/13
none

## 2023-07-25 ENCOUNTER — APPOINTMENT (OUTPATIENT)
Dept: OBGYN | Facility: CLINIC | Age: 38
End: 2023-07-25
Payer: COMMERCIAL

## 2023-07-25 VITALS
DIASTOLIC BLOOD PRESSURE: 75 MMHG | SYSTOLIC BLOOD PRESSURE: 121 MMHG | HEIGHT: 65 IN | WEIGHT: 150 LBS | BODY MASS INDEX: 24.99 KG/M2

## 2023-07-25 DIAGNOSIS — Z01.419 ENCOUNTER FOR GYNECOLOGICAL EXAMINATION (GENERAL) (ROUTINE) W/OUT ABNORMAL FINDINGS: ICD-10-CM

## 2023-07-25 DIAGNOSIS — Z97.5 PRESENCE OF (INTRAUTERINE) CONTRACEPTIVE DEVICE: ICD-10-CM

## 2023-07-25 PROCEDURE — 99385 PREV VISIT NEW AGE 18-39: CPT

## 2023-07-25 NOTE — HISTORY OF PRESENT ILLNESS
[FreeTextEntry1] : 2023. ROBINSON WHITFIELD 37 year old female  LMP IUD. 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 She presents today to establish gynecologic care. \par \par Pt has a Mirena IUD that was placed on  . Additionally, she had a Lap L salpingectomy due to a L tubal torsion and rupture paratubal cyst.  x2. No Hx of STD or PID. No Hx of abnormal pap smear. No uterine fibroid, endometriosis, pelvic infection or infertility.\par \par She feels well and offers no complaints. She reports no menses with Mirena IUD. She denies intermenstrual bleeding, abnormal vaginal discharge or vaginitis sxs. No urinary complaints. BM is normal per patient, no bloody stool. She denies abdominal and pelvic pain.\par \par Pt does not know if she wants to conceive again due to her oldest son having Autism. \par \par GynHx: fallopian tube torsion and rupture paratubal cyst,  x2\par PMH: none\par SHx: L salpingectomy 3/22\par Allergies: immune to morphine \par Meds: Vit D, Mirena IUD placed on \par FHx: Pt oldest son has Autism. Denies FHx of breast, ovarian, uterine or colon cancer.

## 2023-07-25 NOTE — PLAN
[FreeTextEntry1] : 37 year old female presents for routine gyn exam - fallopian tube torsion and rupture paratubal cyst, s/p L salpingectomy on 3/22, Mirena IUD placed on 2/19\par BSE taught\par Breast and pelvic exam performed\par Pap/HPV conducted\par \par Hx of fallopian tube torsion and rupture paratubal cyst\par Advised to schedule pelvic sono\par \par Contraception\par Pt has Mirena IUD placed on 2/19 to be replaced on 2/27.\par \par Preconception\par Discussed w/ pt timing of IUD removal if she decides she wants to conceive again. \par \par RTO in 1 year or PRN

## 2023-07-27 LAB — HPV HIGH+LOW RISK DNA PNL CVX: NOT DETECTED

## 2023-07-31 ENCOUNTER — ASOB RESULT (OUTPATIENT)
Age: 38
End: 2023-07-31

## 2023-07-31 ENCOUNTER — APPOINTMENT (OUTPATIENT)
Dept: OBGYN | Facility: CLINIC | Age: 38
End: 2023-07-31
Payer: COMMERCIAL

## 2023-07-31 PROCEDURE — 76830 TRANSVAGINAL US NON-OB: CPT

## 2023-08-02 LAB — CYTOLOGY CVX/VAG DOC THIN PREP: NORMAL

## 2023-11-16 NOTE — PATIENT PROFILE ADULT - FUNCTIONAL ASSESSMENT - DAILY ACTIVITY 5.
4 = No assist / stand by assistance Full Thickness Lip Wedge Repair (Flap) Text: In order to maintain form and function of the lip, and to avoid distortion of the free margin, a lip advancement flap was planned. After prep and local anesthesia, Burow???s triangles were excised superiorly to the nasal sill and inferiorly around the lip to the labial mucosa.  Two flaps were elevated by undermining the skin laterally in both directions,  the skin and mucosa from the orbicularis muscle.  Any redundant orbicularis oris muscle was removed and complimentary edges of remaining muscle reapposed with a horizontal mattress stitch.  After hemostasis was obtained, the flaps were carried over and closed in a layered fashion.

## 2024-02-06 ENCOUNTER — NON-APPOINTMENT (OUTPATIENT)
Age: 39
End: 2024-02-06

## 2024-04-16 NOTE — ED PROVIDER NOTE - ATTENDING CONTRIBUTION TO CARE
Dr. Martini : I have personally seen and examined this patient at the bedside. I have fully participated in the care of this patient. I have reviewed all pertinent clinical information, including history, physical exam, plan and the Resident's note and agree except as noted.     32F  lmp 2018 pw acute onset llq pain. severe non radiating cramping, followed by nausea/vomiting. pain lasted for 2 hrs . resolved while in ED.   Denies f/c/n/v/cp/sob/palpitations/coughd/c/dysuria/hematuria. no sick contacts/recent travel.    PE:  head; atraumatic normocephalic  eyes: perrla  Heart: rrr s1s2  lungs: ctab  abd: soft, nt nd + bs no rebound/guarding no cva ttp  le: no swelling no calf ttp  back: no midline cervical/thoracic/lumbar ttp    -->will fu sono to confirm IUP ro torsion/ectopic; labs analgesia as needed UA; reassess --large cyst on left ovary + iup will consult GYN pt pain free at this time Local infection of skin and subcutaneous tissue

## 2024-12-21 ENCOUNTER — NON-APPOINTMENT (OUTPATIENT)
Age: 39
End: 2024-12-21

## 2025-03-31 ENCOUNTER — NON-APPOINTMENT (OUTPATIENT)
Age: 40
End: 2025-03-31

## 2025-04-01 ENCOUNTER — NON-APPOINTMENT (OUTPATIENT)
Age: 40
End: 2025-04-01

## 2025-04-01 ENCOUNTER — APPOINTMENT (OUTPATIENT)
Dept: OBGYN | Facility: CLINIC | Age: 40
End: 2025-04-01
Payer: COMMERCIAL

## 2025-04-01 VITALS
SYSTOLIC BLOOD PRESSURE: 117 MMHG | BODY MASS INDEX: 24.99 KG/M2 | DIASTOLIC BLOOD PRESSURE: 73 MMHG | WEIGHT: 150 LBS | HEIGHT: 65 IN

## 2025-04-01 DIAGNOSIS — Z13.31 ENCOUNTER FOR SCREENING FOR DEPRESSION: ICD-10-CM

## 2025-04-01 DIAGNOSIS — Z01.419 ENCOUNTER FOR GYNECOLOGICAL EXAMINATION (GENERAL) (ROUTINE) W/OUT ABNORMAL FINDINGS: ICD-10-CM

## 2025-04-01 DIAGNOSIS — Z97.5 PRESENCE OF (INTRAUTERINE) CONTRACEPTIVE DEVICE: ICD-10-CM

## 2025-04-01 PROCEDURE — 99459 PELVIC EXAMINATION: CPT

## 2025-04-01 PROCEDURE — G0444 DEPRESSION SCREEN ANNUAL: CPT | Mod: 59

## 2025-04-01 PROCEDURE — 99395 PREV VISIT EST AGE 18-39: CPT

## 2025-04-02 LAB — HPV HIGH+LOW RISK DNA PNL CVX: NOT DETECTED

## 2025-04-07 LAB — CYTOLOGY CVX/VAG DOC THIN PREP: NORMAL

## (undated) DEVICE — DRSG STERISTRIPS 0.5 X 4"

## (undated) DEVICE — MEDICATION LABELS W MARKER

## (undated) DEVICE — MARKING PEN W RULER

## (undated) DEVICE — DRAPE INSTRUMENT POUCH 6.75" X 11"

## (undated) DEVICE — PACK GYN LAPAROSCOPY

## (undated) DEVICE — DRSG DERMABOND 0.7ML

## (undated) DEVICE — RUMI TIP BLUE 6.7MM X 8CM

## (undated) DEVICE — GLV 7 PROTEXIS (WHITE)

## (undated) DEVICE — SUT VLOC 180 0 18" GS-21 GREEN

## (undated) DEVICE — TUBING INSUFFLATION LAP FILTER 10FT

## (undated) DEVICE — NDL HYPO REGULAR BEVEL 22G X 1.5" (TURQUOISE)

## (undated) DEVICE — PREP BETADINE SPONGE STICKS

## (undated) DEVICE — FOLEY TRAY 16FR 5CC LTX UMETER CLOSED

## (undated) DEVICE — POSITIONER FOAM EGG CRATE ULNAR 2PCS (PINK)

## (undated) DEVICE — SHEARS COVIDIEN ENDO SHEAR 5MM X 31CM W UNIPOLAR CAUTERY

## (undated) DEVICE — VALVE YELLOW PORT SEAL PLUS 5MM

## (undated) DEVICE — WARMING BLANKET UPPER ADULT

## (undated) DEVICE — UTERINE MANIPULATOR COOPER SURGICAL 5MM 33CM GREEN

## (undated) DEVICE — ELCTR BOVIE PENCIL SMOKE EVACUATION

## (undated) DEVICE — APPLICATOR FOR ARISTA XL 38CM

## (undated) DEVICE — TROCAR COVIDIEN BLUNT TIP HASSAN 10MM

## (undated) DEVICE — SPECIMEN CONTAINER 100ML

## (undated) DEVICE — GLV 6.5 PROTEXIS (WHITE)

## (undated) DEVICE — TROCAR COVIDIEN VERSAPORT BLADELESS OPTICAL 5MM STANDARD

## (undated) DEVICE — SOL IRR POUR H2O 250ML

## (undated) DEVICE — SOL IRR POUR NS 0.9% 500ML

## (undated) DEVICE — SYR LUER LOK 10CC

## (undated) DEVICE — DRAPE LIGHT HANDLE COVER (BLUE)

## (undated) DEVICE — ELCTR CORD FOOTSWITCH 1PLR LAPSCP 10FT

## (undated) DEVICE — LIGASURE BLUNT TIP 37CM

## (undated) DEVICE — PREP CHLORAPREP HI-LITE ORANGE 26ML

## (undated) DEVICE — SUT BIOSYN 4-0 18" P-12

## (undated) DEVICE — SUT VLOC 180 0 12" GS-21 GREEN

## (undated) DEVICE — BLADE SCALPEL SAFETYLOCK #10

## (undated) DEVICE — SUT POLYSORB 0 30" GS-23

## (undated) DEVICE — GOWN TRIMAX LG

## (undated) DEVICE — GLV 7.5 PROTEXIS (WHITE)

## (undated) DEVICE — TROCAR APPLIED MEDICAL KII BALLOON BLUNT TIP 12MM X 100MM

## (undated) DEVICE — INSUFFLATION NDL COVIDIEN STEP 14G FOR STEP/VERSASTEP

## (undated) DEVICE — TUBING STRYKEFLOW II SUCTION / IRRIGATOR

## (undated) DEVICE — DRAPE 3/4 SHEET W REINFORCEMENT 56X77"

## (undated) DEVICE — DRAPE MAYO STAND 30"

## (undated) DEVICE — BLADE SCALPEL SAFETYLOCK #15

## (undated) DEVICE — DRAPE TOWEL BLUE 17" X 24"

## (undated) DEVICE — TROCAR GELPOINT MINI ADVANCED

## (undated) DEVICE — SUT VLOC 180 0 9" GS-21 GREEN

## (undated) DEVICE — Device

## (undated) DEVICE — TROCAR COVIDIEN VERSASTEP PLUS 11MM STANDARD

## (undated) DEVICE — ENDOCATCH 10MM SPECIMEN POUCH

## (undated) DEVICE — UTERINE MANIPULATOR CLINICAL INNOVATIONS CLEARVIEW 7CM

## (undated) DEVICE — VENODYNE/SCD SLEEVE CALF LARGE

## (undated) DEVICE — APPLICATOR VISTASEAL LAP DUAL 35CM RIGID